# Patient Record
Sex: MALE | Race: WHITE | NOT HISPANIC OR LATINO | Employment: FULL TIME | URBAN - NONMETROPOLITAN AREA
[De-identification: names, ages, dates, MRNs, and addresses within clinical notes are randomized per-mention and may not be internally consistent; named-entity substitution may affect disease eponyms.]

---

## 2018-03-08 ENCOUNTER — TRANSCRIBE ORDERS (OUTPATIENT)
Dept: LAB | Facility: HOSPITAL | Age: 53
End: 2018-03-08

## 2018-03-08 ENCOUNTER — HOSPITAL ENCOUNTER (OUTPATIENT)
Dept: GENERAL RADIOLOGY | Facility: HOSPITAL | Age: 53
Discharge: HOME | End: 2018-03-08
Admitting: NURSE PRACTITIONER

## 2018-03-08 DIAGNOSIS — S40.011A CONTUSION OF MULTIPLE SITES OF SHOULDER, RIGHT, INITIAL ENCOUNTER: Primary | ICD-10-CM

## 2018-03-08 DIAGNOSIS — S40.011A CONTUSION OF MULTIPLE SITES OF SHOULDER, RIGHT, INITIAL ENCOUNTER: ICD-10-CM

## 2018-03-08 PROCEDURE — 73030 X-RAY EXAM OF SHOULDER: CPT | Performed by: RADIOLOGY

## 2018-03-08 PROCEDURE — 73030 X-RAY EXAM OF SHOULDER: CPT

## 2020-08-30 ENCOUNTER — HOSPITAL ENCOUNTER (EMERGENCY)
Facility: HOSPITAL | Age: 55
Discharge: HOME | End: 2020-08-30
Attending: FAMILY MEDICINE | Admitting: FAMILY MEDICINE

## 2020-08-30 ENCOUNTER — APPOINTMENT (OUTPATIENT)
Dept: GENERAL RADIOLOGY | Facility: HOSPITAL | Age: 55
End: 2020-08-30

## 2020-08-30 VITALS
WEIGHT: 240 LBS | HEART RATE: 85 BPM | OXYGEN SATURATION: 96 % | HEIGHT: 68 IN | SYSTOLIC BLOOD PRESSURE: 135 MMHG | RESPIRATION RATE: 18 BRPM | BODY MASS INDEX: 36.37 KG/M2 | DIASTOLIC BLOOD PRESSURE: 90 MMHG | TEMPERATURE: 97.5 F

## 2020-08-30 DIAGNOSIS — M65.9 TENOSYNOVITIS OF LEFT HAND: Primary | ICD-10-CM

## 2020-08-30 PROCEDURE — 73120 X-RAY EXAM OF HAND: CPT

## 2020-08-30 PROCEDURE — 99283 EMERGENCY DEPT VISIT LOW MDM: CPT

## 2020-08-30 PROCEDURE — 96372 THER/PROPH/DIAG INJ SC/IM: CPT

## 2020-08-30 PROCEDURE — 25010000002 KETOROLAC TROMETHAMINE PER 15 MG: Performed by: FAMILY MEDICINE

## 2020-08-30 RX ORDER — KETOROLAC TROMETHAMINE 30 MG/ML
60 INJECTION, SOLUTION INTRAMUSCULAR; INTRAVENOUS ONCE
Status: COMPLETED | OUTPATIENT
Start: 2020-08-30 | End: 2020-08-30

## 2020-08-30 RX ORDER — IBUPROFEN 800 MG/1
800 TABLET ORAL
Qty: 90 TABLET | Refills: 0 | OUTPATIENT
Start: 2020-08-30 | End: 2022-11-24

## 2020-08-30 RX ADMIN — KETOROLAC TROMETHAMINE 60 MG: 60 INJECTION, SOLUTION INTRAMUSCULAR at 20:50

## 2021-03-18 ENCOUNTER — BULK ORDERING (OUTPATIENT)
Dept: CASE MANAGEMENT | Facility: OTHER | Age: 56
End: 2021-03-18

## 2021-03-18 DIAGNOSIS — Z23 IMMUNIZATION DUE: ICD-10-CM

## 2022-11-24 ENCOUNTER — HOSPITAL ENCOUNTER (EMERGENCY)
Facility: HOSPITAL | Age: 57
Discharge: HOME | End: 2022-11-24
Attending: EMERGENCY MEDICINE | Admitting: EMERGENCY MEDICINE

## 2022-11-24 ENCOUNTER — APPOINTMENT (OUTPATIENT)
Dept: GENERAL RADIOLOGY | Facility: HOSPITAL | Age: 57
End: 2022-11-24

## 2022-11-24 ENCOUNTER — APPOINTMENT (OUTPATIENT)
Dept: CT IMAGING | Facility: HOSPITAL | Age: 57
End: 2022-11-24

## 2022-11-24 VITALS
OXYGEN SATURATION: 98 % | TEMPERATURE: 98.2 F | HEIGHT: 68 IN | RESPIRATION RATE: 16 BRPM | BODY MASS INDEX: 34.86 KG/M2 | WEIGHT: 230 LBS | SYSTOLIC BLOOD PRESSURE: 125 MMHG | HEART RATE: 62 BPM | DIASTOLIC BLOOD PRESSURE: 80 MMHG

## 2022-11-24 DIAGNOSIS — R56.9 SEIZURE: Primary | ICD-10-CM

## 2022-11-24 LAB
ALBUMIN SERPL-MCNC: 4.2 G/DL (ref 3.5–5.2)
ALBUMIN/GLOB SERPL: 1.5 G/DL
ALP SERPL-CCNC: 97 U/L (ref 39–117)
ALT SERPL W P-5'-P-CCNC: 31 U/L (ref 1–41)
ANION GAP SERPL CALCULATED.3IONS-SCNC: 13.6 MMOL/L (ref 5–15)
AST SERPL-CCNC: 26 U/L (ref 1–40)
BASOPHILS # BLD AUTO: 0.03 10*3/MM3 (ref 0–0.2)
BASOPHILS NFR BLD AUTO: 0.3 % (ref 0–1.5)
BILIRUB SERPL-MCNC: 0.3 MG/DL (ref 0–1.2)
BUN SERPL-MCNC: 21 MG/DL (ref 6–20)
BUN/CREAT SERPL: 16.9 (ref 7–25)
CALCIUM SPEC-SCNC: 8.6 MG/DL (ref 8.6–10.5)
CHLORIDE SERPL-SCNC: 104 MMOL/L (ref 98–107)
CK SERPL-CCNC: 372 U/L (ref 20–200)
CO2 SERPL-SCNC: 19.4 MMOL/L (ref 22–29)
CREAT SERPL-MCNC: 1.24 MG/DL (ref 0.76–1.27)
DEPRECATED RDW RBC AUTO: 48.2 FL (ref 37–54)
EGFRCR SERPLBLD CKD-EPI 2021: 67 ML/MIN/1.73
EOSINOPHIL # BLD AUTO: 0.06 10*3/MM3 (ref 0–0.4)
EOSINOPHIL NFR BLD AUTO: 0.5 % (ref 0.3–6.2)
ERYTHROCYTE [DISTWIDTH] IN BLOOD BY AUTOMATED COUNT: 13.7 % (ref 12.3–15.4)
GLOBULIN UR ELPH-MCNC: 2.8 GM/DL
GLUCOSE SERPL-MCNC: 115 MG/DL (ref 65–99)
HCT VFR BLD AUTO: 46.2 % (ref 37.5–51)
HGB BLD-MCNC: 15.2 G/DL (ref 13–17.7)
HOLD SPECIMEN: NORMAL
HOLD SPECIMEN: NORMAL
IMM GRANULOCYTES # BLD AUTO: 0.04 10*3/MM3 (ref 0–0.05)
IMM GRANULOCYTES NFR BLD AUTO: 0.4 % (ref 0–0.5)
LYMPHOCYTES # BLD AUTO: 0.88 10*3/MM3 (ref 0.7–3.1)
LYMPHOCYTES NFR BLD AUTO: 7.8 % (ref 19.6–45.3)
MAGNESIUM SERPL-MCNC: 2.8 MG/DL (ref 1.6–2.6)
MCH RBC QN AUTO: 31.5 PG (ref 26.6–33)
MCHC RBC AUTO-ENTMCNC: 32.9 G/DL (ref 31.5–35.7)
MCV RBC AUTO: 95.9 FL (ref 79–97)
MONOCYTES # BLD AUTO: 0.73 10*3/MM3 (ref 0.1–0.9)
MONOCYTES NFR BLD AUTO: 6.5 % (ref 5–12)
MYOGLOBIN SERPL-MCNC: 962.1 NG/ML (ref 28–72)
NEUTROPHILS NFR BLD AUTO: 84.5 % (ref 42.7–76)
NEUTROPHILS NFR BLD AUTO: 9.49 10*3/MM3 (ref 1.7–7)
NRBC BLD AUTO-RTO: 0 /100 WBC (ref 0–0.2)
PLATELET # BLD AUTO: 171 10*3/MM3 (ref 140–450)
PMV BLD AUTO: 9.3 FL (ref 6–12)
POTASSIUM SERPL-SCNC: 3.6 MMOL/L (ref 3.5–5.2)
PROT SERPL-MCNC: 7 G/DL (ref 6–8.5)
RBC # BLD AUTO: 4.82 10*6/MM3 (ref 4.14–5.8)
SODIUM SERPL-SCNC: 137 MMOL/L (ref 136–145)
TROPONIN T SERPL-MCNC: <0.01 NG/ML (ref 0–0.03)
WBC NRBC COR # BLD: 11.23 10*3/MM3 (ref 3.4–10.8)
WHOLE BLOOD HOLD COAG: NORMAL
WHOLE BLOOD HOLD SPECIMEN: NORMAL

## 2022-11-24 PROCEDURE — 83874 ASSAY OF MYOGLOBIN: CPT | Performed by: PHYSICIAN ASSISTANT

## 2022-11-24 PROCEDURE — 99284 EMERGENCY DEPT VISIT MOD MDM: CPT

## 2022-11-24 PROCEDURE — 85025 COMPLETE CBC W/AUTO DIFF WBC: CPT | Performed by: PHYSICIAN ASSISTANT

## 2022-11-24 PROCEDURE — 93005 ELECTROCARDIOGRAM TRACING: CPT | Performed by: PHYSICIAN ASSISTANT

## 2022-11-24 PROCEDURE — 71045 X-RAY EXAM CHEST 1 VIEW: CPT

## 2022-11-24 PROCEDURE — 36415 COLL VENOUS BLD VENIPUNCTURE: CPT

## 2022-11-24 PROCEDURE — 82550 ASSAY OF CK (CPK): CPT | Performed by: PHYSICIAN ASSISTANT

## 2022-11-24 PROCEDURE — 73030 X-RAY EXAM OF SHOULDER: CPT

## 2022-11-24 PROCEDURE — 84484 ASSAY OF TROPONIN QUANT: CPT | Performed by: PHYSICIAN ASSISTANT

## 2022-11-24 PROCEDURE — 96374 THER/PROPH/DIAG INJ IV PUSH: CPT

## 2022-11-24 PROCEDURE — 70450 CT HEAD/BRAIN W/O DYE: CPT

## 2022-11-24 PROCEDURE — 80053 COMPREHEN METABOLIC PANEL: CPT | Performed by: PHYSICIAN ASSISTANT

## 2022-11-24 PROCEDURE — 0 LEVETIRACETAM IN NACL 0.54% 1500 MG/100ML SOLUTION: Performed by: PHYSICIAN ASSISTANT

## 2022-11-24 PROCEDURE — 83735 ASSAY OF MAGNESIUM: CPT | Performed by: PHYSICIAN ASSISTANT

## 2022-11-24 RX ORDER — SODIUM CHLORIDE 0.9 % (FLUSH) 0.9 %
10 SYRINGE (ML) INJECTION AS NEEDED
Status: DISCONTINUED | OUTPATIENT
Start: 2022-11-24 | End: 2022-11-24 | Stop reason: HOSPADM

## 2022-11-24 RX ORDER — LEVETIRACETAM 500 MG/1
500 TABLET ORAL 2 TIMES DAILY
Qty: 60 TABLET | Refills: 0 | Status: SHIPPED | OUTPATIENT
Start: 2022-11-24

## 2022-11-24 RX ORDER — LEVETIRACETAM 15 MG/ML
1500 INJECTION INTRAVASCULAR ONCE
Status: COMPLETED | OUTPATIENT
Start: 2022-11-24 | End: 2022-11-24

## 2022-11-24 RX ADMIN — LEVETIRACETAM 1500 MG: 15 INJECTION INTRAVENOUS at 20:05

## 2022-11-24 NOTE — ED PROVIDER NOTES
"Subjective   History of Present Illness  57-year-old male presents via EMS status post episode that was reported to be a seizure.  Patient's daughter saw this episode.  She states that patient was acting \"spacey\" and then subsequently fell backwards with his arms drawn in.  He subsequently had a jerking episode that lasted at least a minute and was confused and nonresponsive for approximately 7 to 8 minutes.  Apparently patient had a similar episode to this 3 to 4 years ago however this was attributed to heatstroke.  Patient denies any history of seizure.  He states has been feeling fine recently.  No nausea vomiting diarrhea.  No chest pain pressure tightness or squeezing.  No abdominal pain.  Patient does have pain in his right shoulder after the fall.  He has neurologically intact at this time.  He is awake alert and oriented x3.  He is not complaining of any other symptoms.        Review of Systems   Constitutional: Negative.  Negative for fever.   HENT: Negative.    Respiratory: Negative.    Cardiovascular: Negative.  Negative for chest pain.   Gastrointestinal: Negative.  Negative for abdominal pain.   Endocrine: Negative.    Genitourinary: Negative.  Negative for dysuria.   Skin: Negative.    Neurological: Negative.    Psychiatric/Behavioral: Negative.    All other systems reviewed and are negative.      Past Medical History:   Diagnosis Date   • Arthritis    • Bulging lumbar disc    • Chronic back pain    • Hypokalemia        No Known Allergies    Past Surgical History:   Procedure Laterality Date   • COLONOSCOPY         Family History   Problem Relation Age of Onset   • Hypokalemia Mother    • No Known Problems Father        Social History     Socioeconomic History   • Marital status:    • Number of children: 1   Tobacco Use   • Smoking status: Former     Packs/day: 1.00     Years: 5.00     Pack years: 5.00     Types: Cigarettes     Quit date:      Years since quittin.9   Substance and " Sexual Activity   • Alcohol use: Yes     Comment: Occasional social drinker   • Drug use: No   • Sexual activity: Defer           Objective   Physical Exam  Vitals and nursing note reviewed.   Constitutional:       General: He is not in acute distress.     Appearance: He is well-developed. He is not diaphoretic.   HENT:      Head: Normocephalic and atraumatic.      Right Ear: External ear normal.      Left Ear: External ear normal.      Nose: Nose normal.   Eyes:      Conjunctiva/sclera: Conjunctivae normal.      Pupils: Pupils are equal, round, and reactive to light.   Neck:      Vascular: No JVD.      Trachea: No tracheal deviation.   Cardiovascular:      Rate and Rhythm: Normal rate and regular rhythm.      Heart sounds: Normal heart sounds. No murmur heard.  Pulmonary:      Effort: Pulmonary effort is normal. No respiratory distress.      Breath sounds: Normal breath sounds. No wheezing.   Abdominal:      General: Bowel sounds are normal.      Palpations: Abdomen is soft.      Tenderness: There is no abdominal tenderness.   Musculoskeletal:         General: No deformity. Normal range of motion.      Cervical back: Normal range of motion and neck supple.   Skin:     General: Skin is warm and dry.      Coloration: Skin is not pale.      Findings: No erythema or rash.   Neurological:      Mental Status: He is alert and oriented to person, place, and time.      Cranial Nerves: No cranial nerve deficit.   Psychiatric:         Behavior: Behavior normal.         Thought Content: Thought content normal.         Procedures           ED Course                                           MDM  Number of Diagnoses or Management Options  Seizure (HCC): new and requires workup     Amount and/or Complexity of Data Reviewed  Clinical lab tests: reviewed and ordered  Tests in the radiology section of CPT®: reviewed and ordered  Tests in the medicine section of CPT®: reviewed and ordered  Independent visualization of images,  tracings, or specimens: yes        Final diagnoses:   Seizure (HCC)       ED Disposition  ED Disposition     ED Disposition   Discharge    Condition   Stable    Comment   --             PATIENT CONNECTION - [facility]  See Provider List  [place] [id number]  [phone]  Schedule an appointment as soon as possible for a visit       Andre Romero, APRN  [address] [facility] [address]  [phone]    Schedule an appointment as soon as possible for a visit       Jose E Bruce MD  [address] [place] [address] 2  [place] [id number]  [phone]    Schedule an appointment as soon as possible for a visit            Medication List      New Prescriptions    levETIRAcetam 500 MG tablet  Commonly known as: KEPPRA  Take 1 tablet by mouth 2 (Two) Times a Day.        Stop    diclofenac 1 % gel gel  Commonly known as: VOLTAREN     ibuprofen 800 MG tablet  Commonly known as: ADVIL,MOTRIN           Where to Get Your Medications      You can get these medications from any pharmacy    Bring a paper prescription for each of these medications  · levETIRAcetam 500 MG tablet          Mason Dunlap PA  11/24/22 [id number]

## 2022-11-25 LAB
QT INTERVAL: 358 MS
QTC INTERVAL: 447 MS

## 2023-06-09 ENCOUNTER — LAB (OUTPATIENT)
Dept: LAB | Facility: HOSPITAL | Age: 58
End: 2023-06-09
Payer: COMMERCIAL

## 2023-06-09 ENCOUNTER — TRANSCRIBE ORDERS (OUTPATIENT)
Dept: ADMINISTRATIVE | Facility: HOSPITAL | Age: 58
End: 2023-06-09
Payer: COMMERCIAL

## 2023-06-09 DIAGNOSIS — Z00.00 ROUTINE GENERAL MEDICAL EXAMINATION AT A HEALTH CARE FACILITY: ICD-10-CM

## 2023-06-09 DIAGNOSIS — Z00.00 ROUTINE GENERAL MEDICAL EXAMINATION AT A HEALTH CARE FACILITY: Primary | ICD-10-CM

## 2023-06-09 LAB
25(OH)D3 SERPL-MCNC: 21.7 NG/ML (ref 30–100)
ALBUMIN SERPL-MCNC: 4.5 G/DL (ref 3.5–5.2)
ALBUMIN/GLOB SERPL: 1.7 G/DL
ALP SERPL-CCNC: 74 U/L (ref 39–117)
ALT SERPL W P-5'-P-CCNC: 18 U/L (ref 1–41)
ANION GAP SERPL CALCULATED.3IONS-SCNC: 12.3 MMOL/L (ref 5–15)
AST SERPL-CCNC: 25 U/L (ref 1–40)
BASOPHILS # BLD AUTO: 0.03 10*3/MM3 (ref 0–0.2)
BASOPHILS NFR BLD AUTO: 0.6 % (ref 0–1.5)
BILIRUB SERPL-MCNC: 1.1 MG/DL (ref 0–1.2)
BUN SERPL-MCNC: 13 MG/DL (ref 6–20)
BUN/CREAT SERPL: 15.1 (ref 7–25)
CALCIUM SPEC-SCNC: 9.1 MG/DL (ref 8.6–10.5)
CHLORIDE SERPL-SCNC: 106 MMOL/L (ref 98–107)
CHOLEST SERPL-MCNC: 214 MG/DL (ref 0–200)
CO2 SERPL-SCNC: 21.7 MMOL/L (ref 22–29)
CREAT SERPL-MCNC: 0.86 MG/DL (ref 0.76–1.27)
DEPRECATED RDW RBC AUTO: 44.7 FL (ref 37–54)
EGFRCR SERPLBLD CKD-EPI 2021: 99.7 ML/MIN/1.73
EOSINOPHIL # BLD AUTO: 0.13 10*3/MM3 (ref 0–0.4)
EOSINOPHIL NFR BLD AUTO: 2.4 % (ref 0.3–6.2)
ERYTHROCYTE [DISTWIDTH] IN BLOOD BY AUTOMATED COUNT: 13.2 % (ref 12.3–15.4)
GLOBULIN UR ELPH-MCNC: 2.6 GM/DL
GLUCOSE SERPL-MCNC: 91 MG/DL (ref 65–99)
HBA1C MFR BLD: 5.2 % (ref 4.8–5.6)
HCT VFR BLD AUTO: 46 % (ref 37.5–51)
HDLC SERPL-MCNC: 57 MG/DL (ref 40–60)
HGB BLD-MCNC: 15.6 G/DL (ref 13–17.7)
IMM GRANULOCYTES # BLD AUTO: 0.01 10*3/MM3 (ref 0–0.05)
IMM GRANULOCYTES NFR BLD AUTO: 0.2 % (ref 0–0.5)
LDLC SERPL CALC-MCNC: 145 MG/DL (ref 0–100)
LDLC/HDLC SERPL: 2.53 {RATIO}
LYMPHOCYTES # BLD AUTO: 1.61 10*3/MM3 (ref 0.7–3.1)
LYMPHOCYTES NFR BLD AUTO: 30.3 % (ref 19.6–45.3)
MCH RBC QN AUTO: 31.9 PG (ref 26.6–33)
MCHC RBC AUTO-ENTMCNC: 33.9 G/DL (ref 31.5–35.7)
MCV RBC AUTO: 94.1 FL (ref 79–97)
MONOCYTES # BLD AUTO: 0.61 10*3/MM3 (ref 0.1–0.9)
MONOCYTES NFR BLD AUTO: 11.5 % (ref 5–12)
NEUTROPHILS NFR BLD AUTO: 2.92 10*3/MM3 (ref 1.7–7)
NEUTROPHILS NFR BLD AUTO: 55 % (ref 42.7–76)
NRBC BLD AUTO-RTO: 0 /100 WBC (ref 0–0.2)
PLATELET # BLD AUTO: 181 10*3/MM3 (ref 140–450)
PMV BLD AUTO: 9.5 FL (ref 6–12)
POTASSIUM SERPL-SCNC: 4.5 MMOL/L (ref 3.5–5.2)
PROT SERPL-MCNC: 7.1 G/DL (ref 6–8.5)
PSA SERPL-MCNC: 0.37 NG/ML (ref 0–4)
RBC # BLD AUTO: 4.89 10*6/MM3 (ref 4.14–5.8)
SODIUM SERPL-SCNC: 140 MMOL/L (ref 136–145)
TRIGL SERPL-MCNC: 65 MG/DL (ref 0–150)
TSH SERPL DL<=0.05 MIU/L-ACNC: 1.6 UIU/ML (ref 0.27–4.2)
VLDLC SERPL-MCNC: 12 MG/DL (ref 5–40)
WBC NRBC COR # BLD: 5.31 10*3/MM3 (ref 3.4–10.8)

## 2023-06-09 PROCEDURE — 83036 HEMOGLOBIN GLYCOSYLATED A1C: CPT

## 2023-06-09 PROCEDURE — 82306 VITAMIN D 25 HYDROXY: CPT

## 2023-06-09 PROCEDURE — 36415 COLL VENOUS BLD VENIPUNCTURE: CPT

## 2023-06-09 PROCEDURE — 80050 GENERAL HEALTH PANEL: CPT

## 2023-06-09 PROCEDURE — 80061 LIPID PANEL: CPT

## 2023-06-09 PROCEDURE — 84153 ASSAY OF PSA TOTAL: CPT

## 2024-03-29 ENCOUNTER — TRANSCRIBE ORDERS (OUTPATIENT)
Dept: ADMINISTRATIVE | Facility: HOSPITAL | Age: 59
End: 2024-03-29
Payer: COMMERCIAL

## 2024-03-29 ENCOUNTER — LAB (OUTPATIENT)
Dept: LAB | Facility: HOSPITAL | Age: 59
End: 2024-03-29
Payer: COMMERCIAL

## 2024-03-29 DIAGNOSIS — E78.5 HYPERLIPIDEMIA, UNSPECIFIED HYPERLIPIDEMIA TYPE: Primary | ICD-10-CM

## 2024-03-29 DIAGNOSIS — E55.9 VITAMIN D DEFICIENCY DISEASE: ICD-10-CM

## 2024-03-29 DIAGNOSIS — Z12.5 SPECIAL SCREENING FOR MALIGNANT NEOPLASM OF PROSTATE: ICD-10-CM

## 2024-03-29 DIAGNOSIS — E78.5 HYPERLIPIDEMIA, UNSPECIFIED HYPERLIPIDEMIA TYPE: ICD-10-CM

## 2024-03-29 DIAGNOSIS — R53.83 TIREDNESS: ICD-10-CM

## 2024-03-29 LAB
25(OH)D3 SERPL-MCNC: 47.9 NG/ML (ref 30–100)
ALBUMIN SERPL-MCNC: 4.5 G/DL (ref 3.5–5.2)
ALBUMIN UR-MCNC: <1.2 MG/DL
ALBUMIN/GLOB SERPL: 1.6 G/DL
ALP SERPL-CCNC: 135 U/L (ref 39–117)
ALT SERPL W P-5'-P-CCNC: 32 U/L (ref 1–41)
ANION GAP SERPL CALCULATED.3IONS-SCNC: 12.9 MMOL/L (ref 5–15)
AST SERPL-CCNC: 27 U/L (ref 1–40)
BASOPHILS # BLD AUTO: 0.04 10*3/MM3 (ref 0–0.2)
BASOPHILS NFR BLD AUTO: 0.6 % (ref 0–1.5)
BILIRUB SERPL-MCNC: 0.3 MG/DL (ref 0–1.2)
BUN SERPL-MCNC: 24 MG/DL (ref 8–23)
BUN/CREAT SERPL: 29.6 (ref 7–25)
CALCIUM SPEC-SCNC: 8.8 MG/DL (ref 8.6–10.5)
CHLORIDE SERPL-SCNC: 106 MMOL/L (ref 98–107)
CHOLEST SERPL-MCNC: 198 MG/DL (ref 0–200)
CO2 SERPL-SCNC: 21.1 MMOL/L (ref 22–29)
CREAT SERPL-MCNC: 0.81 MG/DL (ref 0.76–1.27)
CREAT UR-MCNC: 124.6 MG/DL
DEPRECATED RDW RBC AUTO: 49.1 FL (ref 37–54)
EGFRCR SERPLBLD CKD-EPI 2021: 100.9 ML/MIN/1.73
EOSINOPHIL # BLD AUTO: 0.24 10*3/MM3 (ref 0–0.4)
EOSINOPHIL NFR BLD AUTO: 3.4 % (ref 0.3–6.2)
ERYTHROCYTE [DISTWIDTH] IN BLOOD BY AUTOMATED COUNT: 13.5 % (ref 12.3–15.4)
GLOBULIN UR ELPH-MCNC: 2.9 GM/DL
GLUCOSE SERPL-MCNC: 123 MG/DL (ref 65–99)
HBA1C MFR BLD: 5.3 % (ref 4.8–5.6)
HCT VFR BLD AUTO: 49.8 % (ref 37.5–51)
HDLC SERPL-MCNC: 41 MG/DL (ref 40–60)
HGB BLD-MCNC: 16.4 G/DL (ref 13–17.7)
IMM GRANULOCYTES # BLD AUTO: 0.03 10*3/MM3 (ref 0–0.05)
IMM GRANULOCYTES NFR BLD AUTO: 0.4 % (ref 0–0.5)
LDLC SERPL CALC-MCNC: 100 MG/DL (ref 0–100)
LDLC/HDLC SERPL: 2.19 {RATIO}
LYMPHOCYTES # BLD AUTO: 1.92 10*3/MM3 (ref 0.7–3.1)
LYMPHOCYTES NFR BLD AUTO: 26.8 % (ref 19.6–45.3)
MCH RBC QN AUTO: 31.7 PG (ref 26.6–33)
MCHC RBC AUTO-ENTMCNC: 32.9 G/DL (ref 31.5–35.7)
MCV RBC AUTO: 96.3 FL (ref 79–97)
MICROALBUMIN/CREAT UR: NORMAL MG/G{CREAT}
MONOCYTES # BLD AUTO: 0.53 10*3/MM3 (ref 0.1–0.9)
MONOCYTES NFR BLD AUTO: 7.4 % (ref 5–12)
NEUTROPHILS NFR BLD AUTO: 4.4 10*3/MM3 (ref 1.7–7)
NEUTROPHILS NFR BLD AUTO: 61.4 % (ref 42.7–76)
NRBC BLD AUTO-RTO: 0 /100 WBC (ref 0–0.2)
PLATELET # BLD AUTO: 206 10*3/MM3 (ref 140–450)
PMV BLD AUTO: 9.6 FL (ref 6–12)
POTASSIUM SERPL-SCNC: 4 MMOL/L (ref 3.5–5.2)
PROT SERPL-MCNC: 7.4 G/DL (ref 6–8.5)
PSA SERPL-MCNC: 0.58 NG/ML (ref 0–4)
RBC # BLD AUTO: 5.17 10*6/MM3 (ref 4.14–5.8)
SODIUM SERPL-SCNC: 140 MMOL/L (ref 136–145)
T4 FREE SERPL-MCNC: 1.19 NG/DL (ref 0.93–1.7)
TRIGL SERPL-MCNC: 336 MG/DL (ref 0–150)
TSH SERPL DL<=0.05 MIU/L-ACNC: 1.53 UIU/ML (ref 0.27–4.2)
VIT B12 BLD-MCNC: 459 PG/ML (ref 211–946)
VLDLC SERPL-MCNC: 57 MG/DL (ref 5–40)
WBC NRBC COR # BLD AUTO: 7.16 10*3/MM3 (ref 3.4–10.8)

## 2024-03-29 PROCEDURE — 36415 COLL VENOUS BLD VENIPUNCTURE: CPT

## 2024-03-29 PROCEDURE — 82043 UR ALBUMIN QUANTITATIVE: CPT

## 2024-03-29 PROCEDURE — 82306 VITAMIN D 25 HYDROXY: CPT

## 2024-03-29 PROCEDURE — G0103 PSA SCREENING: HCPCS

## 2024-03-29 PROCEDURE — 80050 GENERAL HEALTH PANEL: CPT

## 2024-03-29 PROCEDURE — 83036 HEMOGLOBIN GLYCOSYLATED A1C: CPT

## 2024-03-29 PROCEDURE — 82570 ASSAY OF URINE CREATININE: CPT

## 2024-03-29 PROCEDURE — 84439 ASSAY OF FREE THYROXINE: CPT

## 2024-03-29 PROCEDURE — 81001 URINALYSIS AUTO W/SCOPE: CPT

## 2024-03-29 PROCEDURE — 82607 VITAMIN B-12: CPT

## 2024-03-29 PROCEDURE — 80061 LIPID PANEL: CPT

## 2024-03-30 LAB
BACTERIA UR QL AUTO: NORMAL /HPF
BILIRUB UR QL STRIP: NEGATIVE
CLARITY UR: CLEAR
COD CRY URNS QL: NORMAL /HPF
COLOR UR: YELLOW
GLUCOSE UR STRIP-MCNC: NEGATIVE MG/DL
HGB UR QL STRIP.AUTO: NEGATIVE
HYALINE CASTS UR QL AUTO: NORMAL /LPF
KETONES UR QL STRIP: NEGATIVE
LEUKOCYTE ESTERASE UR QL STRIP.AUTO: NEGATIVE
NITRITE UR QL STRIP: NEGATIVE
PH UR STRIP.AUTO: 5.5 [PH] (ref 5–8)
PROT UR QL STRIP: NEGATIVE
RBC # UR STRIP: NORMAL /HPF
REF LAB TEST METHOD: NORMAL
SP GR UR STRIP: 1.02 (ref 1–1.03)
SQUAMOUS #/AREA URNS HPF: NORMAL /HPF
UROBILINOGEN UR QL STRIP: NORMAL
WBC # UR STRIP: NORMAL /HPF

## 2024-07-05 ENCOUNTER — OFFICE VISIT (OUTPATIENT)
Dept: UROLOGY | Facility: CLINIC | Age: 59
End: 2024-07-05
Payer: COMMERCIAL

## 2024-07-05 ENCOUNTER — TRANSCRIBE ORDERS (OUTPATIENT)
Dept: ADMINISTRATIVE | Facility: HOSPITAL | Age: 59
End: 2024-07-05
Payer: COMMERCIAL

## 2024-07-05 ENCOUNTER — LAB (OUTPATIENT)
Dept: LAB | Facility: HOSPITAL | Age: 59
End: 2024-07-05
Payer: COMMERCIAL

## 2024-07-05 VITALS
HEIGHT: 67 IN | BODY MASS INDEX: 34.06 KG/M2 | HEART RATE: 66 BPM | DIASTOLIC BLOOD PRESSURE: 88 MMHG | WEIGHT: 217 LBS | SYSTOLIC BLOOD PRESSURE: 151 MMHG

## 2024-07-05 DIAGNOSIS — R73.09 ELEVATED GLUCOSE: ICD-10-CM

## 2024-07-05 DIAGNOSIS — R53.83 OTHER FATIGUE: ICD-10-CM

## 2024-07-05 DIAGNOSIS — J06.9 UPPER RESPIRATORY TRACT INFECTION, UNSPECIFIED TYPE: ICD-10-CM

## 2024-07-05 DIAGNOSIS — J06.9 UPPER RESPIRATORY TRACT INFECTION, UNSPECIFIED TYPE: Primary | ICD-10-CM

## 2024-07-05 DIAGNOSIS — E55.9 VITAMIN D DEFICIENCY: ICD-10-CM

## 2024-07-05 DIAGNOSIS — Z12.11 COLON CANCER SCREENING: ICD-10-CM

## 2024-07-05 DIAGNOSIS — N48.1 BALANITIS: Primary | ICD-10-CM

## 2024-07-05 DIAGNOSIS — E78.5 HYPERLIPIDEMIA, UNSPECIFIED HYPERLIPIDEMIA TYPE: ICD-10-CM

## 2024-07-05 LAB
25(OH)D3 SERPL-MCNC: 42.4 NG/ML (ref 30–100)
ALBUMIN SERPL-MCNC: 4.3 G/DL (ref 3.5–5.2)
ALBUMIN/GLOB SERPL: 1.8 G/DL
ALP SERPL-CCNC: 82 U/L (ref 39–117)
ALT SERPL W P-5'-P-CCNC: 23 U/L (ref 1–41)
ANION GAP SERPL CALCULATED.3IONS-SCNC: 11 MMOL/L (ref 5–15)
AST SERPL-CCNC: 27 U/L (ref 1–40)
BACTERIA UR QL AUTO: NORMAL /HPF
BASOPHILS # BLD AUTO: 0.05 10*3/MM3 (ref 0–0.2)
BASOPHILS NFR BLD AUTO: 0.8 % (ref 0–1.5)
BILIRUB SERPL-MCNC: 0.5 MG/DL (ref 0–1.2)
BILIRUB UR QL STRIP: NEGATIVE
BUN SERPL-MCNC: 15 MG/DL (ref 8–23)
BUN/CREAT SERPL: 13 (ref 7–25)
CALCIUM SPEC-SCNC: 9.3 MG/DL (ref 8.6–10.5)
CHLORIDE SERPL-SCNC: 103 MMOL/L (ref 98–107)
CHOLEST SERPL-MCNC: 187 MG/DL (ref 0–200)
CLARITY UR: CLEAR
CO2 SERPL-SCNC: 24 MMOL/L (ref 22–29)
COLOR UR: YELLOW
CREAT SERPL-MCNC: 1.15 MG/DL (ref 0.76–1.27)
DEPRECATED RDW RBC AUTO: 51.1 FL (ref 37–54)
EGFRCR SERPLBLD CKD-EPI 2021: 72.9 ML/MIN/1.73
EOSINOPHIL # BLD AUTO: 0.12 10*3/MM3 (ref 0–0.4)
EOSINOPHIL NFR BLD AUTO: 1.8 % (ref 0.3–6.2)
ERYTHROCYTE [DISTWIDTH] IN BLOOD BY AUTOMATED COUNT: 14.6 % (ref 12.3–15.4)
GLOBULIN UR ELPH-MCNC: 2.4 GM/DL
GLUCOSE SERPL-MCNC: 89 MG/DL (ref 65–99)
GLUCOSE UR STRIP-MCNC: NEGATIVE MG/DL
HBA1C MFR BLD: 5.2 % (ref 4.8–5.6)
HCT VFR BLD AUTO: 43.7 % (ref 37.5–51)
HDLC SERPL-MCNC: 43 MG/DL (ref 40–60)
HGB BLD-MCNC: 14.5 G/DL (ref 13–17.7)
HGB UR QL STRIP.AUTO: NEGATIVE
HYALINE CASTS UR QL AUTO: NORMAL /LPF
IMM GRANULOCYTES # BLD AUTO: 0.02 10*3/MM3 (ref 0–0.05)
IMM GRANULOCYTES NFR BLD AUTO: 0.3 % (ref 0–0.5)
KETONES UR QL STRIP: NEGATIVE
LDLC SERPL CALC-MCNC: 95 MG/DL (ref 0–100)
LDLC/HDLC SERPL: 1.99 {RATIO}
LEUKOCYTE ESTERASE UR QL STRIP.AUTO: NEGATIVE
LYMPHOCYTES # BLD AUTO: 2.07 10*3/MM3 (ref 0.7–3.1)
LYMPHOCYTES NFR BLD AUTO: 31.7 % (ref 19.6–45.3)
MCH RBC QN AUTO: 31.6 PG (ref 26.6–33)
MCHC RBC AUTO-ENTMCNC: 33.2 G/DL (ref 31.5–35.7)
MCV RBC AUTO: 95.2 FL (ref 79–97)
MONOCYTES # BLD AUTO: 0.66 10*3/MM3 (ref 0.1–0.9)
MONOCYTES NFR BLD AUTO: 10.1 % (ref 5–12)
NEUTROPHILS NFR BLD AUTO: 3.6 10*3/MM3 (ref 1.7–7)
NEUTROPHILS NFR BLD AUTO: 55.3 % (ref 42.7–76)
NITRITE UR QL STRIP: NEGATIVE
NRBC BLD AUTO-RTO: 0 /100 WBC (ref 0–0.2)
PH UR STRIP.AUTO: 7.5 [PH] (ref 5–8)
PLATELET # BLD AUTO: 177 10*3/MM3 (ref 140–450)
PMV BLD AUTO: 9.3 FL (ref 6–12)
POTASSIUM SERPL-SCNC: 4.3 MMOL/L (ref 3.5–5.2)
PROT SERPL-MCNC: 6.7 G/DL (ref 6–8.5)
PROT UR QL STRIP: NEGATIVE
RBC # BLD AUTO: 4.59 10*6/MM3 (ref 4.14–5.8)
RBC # UR STRIP: NORMAL /HPF
REF LAB TEST METHOD: NORMAL
SODIUM SERPL-SCNC: 138 MMOL/L (ref 136–145)
SP GR UR STRIP: 1.01 (ref 1–1.03)
SQUAMOUS #/AREA URNS HPF: NORMAL /HPF
TRIGL SERPL-MCNC: 292 MG/DL (ref 0–150)
UROBILINOGEN UR QL STRIP: NORMAL
VLDLC SERPL-MCNC: 49 MG/DL (ref 5–40)
WBC # UR STRIP: NORMAL /HPF
WBC NRBC COR # BLD AUTO: 6.52 10*3/MM3 (ref 3.4–10.8)

## 2024-07-05 PROCEDURE — 36415 COLL VENOUS BLD VENIPUNCTURE: CPT

## 2024-07-05 PROCEDURE — 80061 LIPID PANEL: CPT

## 2024-07-05 PROCEDURE — 81001 URINALYSIS AUTO W/SCOPE: CPT

## 2024-07-05 PROCEDURE — 80053 COMPREHEN METABOLIC PANEL: CPT

## 2024-07-05 PROCEDURE — 85025 COMPLETE CBC W/AUTO DIFF WBC: CPT

## 2024-07-05 PROCEDURE — 82306 VITAMIN D 25 HYDROXY: CPT

## 2024-07-05 PROCEDURE — 83036 HEMOGLOBIN GLYCOSYLATED A1C: CPT

## 2024-07-05 RX ORDER — TETRACYCLINE HCL 500 MG
CAPSULE ORAL
COMMUNITY

## 2024-07-05 RX ORDER — CLOTRIMAZOLE AND BETAMETHASONE DIPROPIONATE 10; .64 MG/G; MG/G
1 CREAM TOPICAL 2 TIMES DAILY
Qty: 45 G | Refills: 1 | Status: SHIPPED | OUTPATIENT
Start: 2024-07-05

## 2024-07-05 NOTE — PROGRESS NOTES
"Chief Complaint:    Chief Complaint   Patient presents with    Balanitis       Vital Signs:   /88 (BP Location: Right arm, Patient Position: Sitting)   Pulse 66   Ht 170.2 cm (67\")   Wt 98.4 kg (217 lb)   BMI 33.99 kg/m²   Body mass index is 33.99 kg/m².      HPI:  Regis Carlson is a 58 y.o. male who presents today for initial evaluation     History of Present Illness  Mr. Carlson presents to the clinic for evaluation of balanitis.  He has been referred to us by DYAN Montemayor.  He has a past medical history significant arthritis, bulging disc in the lumbar spine, and hypokalemia.  Patient states he is uncircumcised and was previously seen by urologist in 2006 and was scheduled to undergo surgery and elective circumcision at that time however his insurance would not pay for it.  He reports some intermittent difficulty with retraction of foreskin and restrictive band at times.  He does note cracking and bleeding of the foreskin.  He denies any abnormal discharge.  On physical exam today he did have some mild balanitis with easily retractable foreskin.  He does wish to undergo circumcision as soon as possible.  It is noted he had a PSA in March that came back great at 0.5.      Past Medical History:  Past Medical History:   Diagnosis Date    Arthritis     Bulging lumbar disc     Chronic back pain     Hypokalemia        Current Meds:  Current Outpatient Medications   Medication Sig Dispense Refill    Apple Cider Vinegar 500 MG tablet Take  by mouth.      Cholecalciferol 125 MCG (5000 UT) tablet Take 1 tablet by mouth Daily.      clotrimazole-betamethasone (LOTRISONE) 1-0.05 % cream Apply 1 Application topically to the appropriate area as directed 2 (Two) Times a Day. 45 g 1    levETIRAcetam (KEPPRA) 500 MG tablet Take 1 tablet by mouth 2 (Two) Times a Day. (Patient not taking: Reported on 7/5/2024) 60 tablet 0     No current facility-administered medications for this visit.        Allergies:   No " Known Allergies     Past Surgical History:  Past Surgical History:   Procedure Laterality Date    COLONOSCOPY         Social History:  Social History     Socioeconomic History    Marital status:     Number of children: 1   Tobacco Use    Smoking status: Former     Current packs/day: 0.00     Average packs/day: 1 pack/day for 5.0 years (5.0 ttl pk-yrs)     Types: Cigarettes     Start date:      Quit date:      Years since quittin.5   Vaping Use    Vaping status: Never Used   Substance and Sexual Activity    Alcohol use: Yes     Comment: Occasional social drinker    Drug use: No    Sexual activity: Defer       Family History:  Family History   Problem Relation Age of Onset    Hypokalemia Mother     No Known Problems Father        Review of Systems:  Review of Systems   Constitutional:  Negative for chills, fatigue and fever.   Respiratory:  Negative for cough, shortness of breath and wheezing.    Cardiovascular:  Negative for leg swelling.   Gastrointestinal:  Negative for abdominal pain, nausea and vomiting.   Genitourinary:  Positive for penile pain. Negative for difficulty urinating, frequency, hematuria, penile discharge, penile swelling, scrotal swelling, testicular pain and urgency.   Musculoskeletal:  Positive for back pain. Negative for joint swelling.   Neurological:  Negative for dizziness and headaches.   Psychiatric/Behavioral:  Negative for confusion.        Physical Exam:  Physical Exam  Constitutional:       General: He is not in acute distress.     Appearance: Normal appearance.   HENT:      Head: Normocephalic and atraumatic.      Nose: Nose normal.      Mouth/Throat:      Mouth: Mucous membranes are moist.   Eyes:      Conjunctiva/sclera: Conjunctivae normal.   Cardiovascular:      Rate and Rhythm: Normal rate and regular rhythm.      Pulses: Normal pulses.      Heart sounds: Normal heart sounds.   Pulmonary:      Effort: Pulmonary effort is normal.      Breath sounds: Normal  breath sounds.   Abdominal:      General: Bowel sounds are normal.      Palpations: Abdomen is soft.   Genitourinary:     Comments: Very mild balanitis with cracking of foreskin.  No bleeding or discharge noted  Musculoskeletal:         General: Normal range of motion.      Cervical back: Normal range of motion.   Skin:     General: Skin is warm.   Neurological:      General: No focal deficit present.      Mental Status: He is alert and oriented to person, place, and time.   Psychiatric:         Mood and Affect: Mood normal.         Behavior: Behavior normal.         Thought Content: Thought content normal.         Judgment: Judgment normal.              Recent Image (CT and/or KUB):   CT Abdomen and Pelvis: No results found for this or any previous visit.     CT Stone Protocol: No results found for this or any previous visit.     KUB: No results found for this or any previous visit.       Labs:  Brief Urine Lab Results  (Last result in the past 365 days)        Color   Clarity   Blood   Leuk Est   Nitrite   Protein   CREAT   Urine HCG        03/29/24 0902 Yellow   Clear   Negative   Negative   Negative   Negative           03/29/24 0902             124.6               No visits with results within 3 Month(s) from this visit.   Latest known visit with results is:   Lab on 03/29/2024   Component Date Value Ref Range Status    Hemoglobin A1C 03/29/2024 5.30  4.80 - 5.60 % Final    Glucose 03/29/2024 123 (H)  65 - 99 mg/dL Final    BUN 03/29/2024 24 (H)  8 - 23 mg/dL Final    Creatinine 03/29/2024 0.81  0.76 - 1.27 mg/dL Final    Sodium 03/29/2024 140  136 - 145 mmol/L Final    Potassium 03/29/2024 4.0  3.5 - 5.2 mmol/L Final    Chloride 03/29/2024 106  98 - 107 mmol/L Final    CO2 03/29/2024 21.1 (L)  22.0 - 29.0 mmol/L Final    Calcium 03/29/2024 8.8  8.6 - 10.5 mg/dL Final    Total Protein 03/29/2024 7.4  6.0 - 8.5 g/dL Final    Albumin 03/29/2024 4.5  3.5 - 5.2 g/dL Final    ALT (SGPT) 03/29/2024 32  1 - 41 U/L  Final    AST (SGOT) 03/29/2024 27  1 - 40 U/L Final    Alkaline Phosphatase 03/29/2024 135 (H)  39 - 117 U/L Final    Total Bilirubin 03/29/2024 0.3  0.0 - 1.2 mg/dL Final    Globulin 03/29/2024 2.9  gm/dL Final    A/G Ratio 03/29/2024 1.6  g/dL Final    BUN/Creatinine Ratio 03/29/2024 29.6 (H)  7.0 - 25.0 Final    Anion Gap 03/29/2024 12.9  5.0 - 15.0 mmol/L Final    eGFR 03/29/2024 100.9  >60.0 mL/min/1.73 Final    Total Cholesterol 03/29/2024 198  0 - 200 mg/dL Final    Triglycerides 03/29/2024 336 (H)  0 - 150 mg/dL Final    HDL Cholesterol 03/29/2024 41  40 - 60 mg/dL Final    LDL Cholesterol  03/29/2024 100  0 - 100 mg/dL Final    VLDL Cholesterol 03/29/2024 57 (H)  5 - 40 mg/dL Final    LDL/HDL Ratio 03/29/2024 2.19   Final    PSA 03/29/2024 0.577  0.000 - 4.000 ng/mL Final    Vitamin B-12 03/29/2024 459  211 - 946 pg/mL Final    TSH 03/29/2024 1.530  0.270 - 4.200 uIU/mL Final    Free T4 03/29/2024 1.19  0.93 - 1.70 ng/dL Final    T4 results may be falsely increased if patient taking Biotin.    25 Hydroxy, Vitamin D 03/29/2024 47.9  30.0 - 100.0 ng/ml Final    Microalbumin/Creatinine Ratio 03/29/2024    Final    Unable to calculate    Creatinine, Urine 03/29/2024 124.6  mg/dL Final    Microalbumin, Urine 03/29/2024 <1.2  mg/dL Final    Color, UA 03/29/2024 Yellow  Yellow, Straw Final    Appearance, UA 03/29/2024 Clear  Clear Final    pH, UA 03/29/2024 5.5  5.0 - 8.0 Final    Specific Gravity, UA 03/29/2024 1.025  1.005 - 1.030 Final    Glucose, UA 03/29/2024 Negative  Negative Final    Ketones, UA 03/29/2024 Negative  Negative Final    Bilirubin, UA 03/29/2024 Negative  Negative Final    Blood, UA 03/29/2024 Negative  Negative Final    Protein, UA 03/29/2024 Negative  Negative Final    Leuk Esterase, UA 03/29/2024 Negative  Negative Final    Nitrite, UA 03/29/2024 Negative  Negative Final    Urobilinogen, UA 03/29/2024 0.2 E.U./dL  0.2 - 1.0 E.U./dL Final    RBC, UA 03/29/2024 0-2  None Seen, 0-2 /HPF Final     WBC, UA 03/29/2024 0-2  None Seen, 0-2 /HPF Final    Bacteria, UA 03/29/2024 None Seen  None Seen /HPF Final    Squamous Epithelial Cells, UA 03/29/2024 0-2  None Seen, 0-2 /HPF Final    Hyaline Casts, UA 03/29/2024 0-2  None Seen /LPF Final    Calcium Oxalate Crystals, UA 03/29/2024 Large/3+  None Seen /HPF Final    Methodology 03/29/2024 Manual Light Microscopy   Final    WBC 03/29/2024 7.16  3.40 - 10.80 10*3/mm3 Final    RBC 03/29/2024 5.17  4.14 - 5.80 10*6/mm3 Final    Hemoglobin 03/29/2024 16.4  13.0 - 17.7 g/dL Final    Hematocrit 03/29/2024 49.8  37.5 - 51.0 % Final    MCV 03/29/2024 96.3  79.0 - 97.0 fL Final    MCH 03/29/2024 31.7  26.6 - 33.0 pg Final    MCHC 03/29/2024 32.9  31.5 - 35.7 g/dL Final    RDW 03/29/2024 13.5  12.3 - 15.4 % Final    RDW-SD 03/29/2024 49.1  37.0 - 54.0 fl Final    MPV 03/29/2024 9.6  6.0 - 12.0 fL Final    Platelets 03/29/2024 206  140 - 450 10*3/mm3 Final    Neutrophil % 03/29/2024 61.4  42.7 - 76.0 % Final    Lymphocyte % 03/29/2024 26.8  19.6 - 45.3 % Final    Monocyte % 03/29/2024 7.4  5.0 - 12.0 % Final    Eosinophil % 03/29/2024 3.4  0.3 - 6.2 % Final    Basophil % 03/29/2024 0.6  0.0 - 1.5 % Final    Immature Grans % 03/29/2024 0.4  0.0 - 0.5 % Final    Neutrophils, Absolute 03/29/2024 4.40  1.70 - 7.00 10*3/mm3 Final    Lymphocytes, Absolute 03/29/2024 1.92  0.70 - 3.10 10*3/mm3 Final    Monocytes, Absolute 03/29/2024 0.53  0.10 - 0.90 10*3/mm3 Final    Eosinophils, Absolute 03/29/2024 0.24  0.00 - 0.40 10*3/mm3 Final    Basophils, Absolute 03/29/2024 0.04  0.00 - 0.20 10*3/mm3 Final    Immature Grans, Absolute 03/29/2024 0.03  0.00 - 0.05 10*3/mm3 Final    nRBC 03/29/2024 0.0  0.0 - 0.2 /100 WBC Final        Procedure: None  Procedures     I have reviewed and agree with the above PMH, PSH, FMH, social history, medications, allergies, and labs.     Assessment/Plan:   Problem List Items Addressed This Visit    None  Visit Diagnoses       Balanitis    -  Primary     Relevant Medications    clotrimazole-betamethasone (LOTRISONE) 1-0.05 % cream    Other Relevant Orders    Case Request (Completed)            Health Maintenance:   Health Maintenance Due   Topic Date Due    BMI FOLLOWUP  Never done    COLORECTAL CANCER SCREENING  Never done    TDAP/TD VACCINES (1 - Tdap) Never done    ZOSTER VACCINE (1 of 2) Never done    RSV Vaccine - Adults (1 - 1-dose 60+ series) Never done    COVID-19 Vaccine (1 - 2023-24 season) Never done    HEPATITIS C SCREENING  Never done    ANNUAL PHYSICAL  Never done        Smoking Counseling: Former smoker.  Never used smokeless tobacco.    Urine Incontinence: Patient reports that he is not currently experiencing any symptoms of urinary incontinence.    Patient was given instructions and counseling regarding his condition or for health maintenance advice. Please see specific information pulled into the AVS if appropriate.    Patient Education:   Balanitis -discussed with the patient the pathophysiology of this condition in detail.  I discussed causes which can include sexual activity, improper hygiene, type 2 diabetes mellitus, and obesity.  Patient had very minimal balanitis and I did recommend conservative treatment methods.  Patient does wish to proceed forward with an elective circumcision in the OR.  Discussed the risk and benefits of this procedure in detail.  Discussed the nature of this procedure as well.  He verbalized understanding and wished to proceed forward.  I will get him scheduled appropriately with Dr. Mak on August 2, 2024.  Advised patient to clean the area with warm soap and water and dry appropriately twice daily.  Advised him to use Lotrisone cream to the affected area twice daily as needed.  Advised not to use more than 2 consecutive weeks for concerns of skin breakdown from steroid atrophy.  Patient verbalized understanding.    Visit Diagnoses:    ICD-10-CM ICD-9-CM   1. Balanitis  N48.1 607.1       Meds Ordered During  Visit:  New Medications Ordered This Visit   Medications    clotrimazole-betamethasone (LOTRISONE) 1-0.05 % cream     Sig: Apply 1 Application topically to the appropriate area as directed 2 (Two) Times a Day.     Dispense:  45 g     Refill:  1       Follow Up Appointment: Circumcision in the OR  No follow-ups on file.      This document has been electronically signed by Vitaliy Major PA-C   July 5, 2024 10:16 EDT    Part of this note may be an electronic transcription/translation of spoken language to printed text using the Dragon Dictation System.

## 2024-08-01 ENCOUNTER — ANESTHESIA EVENT (OUTPATIENT)
Dept: PERIOP | Facility: HOSPITAL | Age: 59
End: 2024-08-01
Payer: COMMERCIAL

## 2024-08-02 ENCOUNTER — HOSPITAL ENCOUNTER (OUTPATIENT)
Facility: HOSPITAL | Age: 59
Setting detail: HOSPITAL OUTPATIENT SURGERY
Discharge: HOME | End: 2024-08-02
Attending: UROLOGY | Admitting: UROLOGY
Payer: COMMERCIAL

## 2024-08-02 ENCOUNTER — ANESTHESIA (OUTPATIENT)
Dept: PERIOP | Facility: HOSPITAL | Age: 59
End: 2024-08-02
Payer: COMMERCIAL

## 2024-08-02 VITALS
HEIGHT: 67 IN | OXYGEN SATURATION: 94 % | DIASTOLIC BLOOD PRESSURE: 82 MMHG | WEIGHT: 216 LBS | BODY MASS INDEX: 33.9 KG/M2 | SYSTOLIC BLOOD PRESSURE: 128 MMHG | TEMPERATURE: 97.9 F | HEART RATE: 64 BPM | RESPIRATION RATE: 16 BRPM

## 2024-08-02 DIAGNOSIS — N48.1 BALANITIS: ICD-10-CM

## 2024-08-02 PROCEDURE — 25010000002 FENTANYL CITRATE (PF) 50 MCG/ML SOLUTION: Performed by: NURSE ANESTHETIST, CERTIFIED REGISTERED

## 2024-08-02 PROCEDURE — 25810000003 LACTATED RINGERS PER 1000 ML: Performed by: NURSE ANESTHETIST, CERTIFIED REGISTERED

## 2024-08-02 PROCEDURE — 25010000002 ONDANSETRON PER 1 MG: Performed by: NURSE ANESTHETIST, CERTIFIED REGISTERED

## 2024-08-02 PROCEDURE — 25010000002 MIDAZOLAM PER 1 MG: Performed by: NURSE ANESTHETIST, CERTIFIED REGISTERED

## 2024-08-02 PROCEDURE — 54161 CIRCUM 28 DAYS OR OLDER: CPT | Performed by: UROLOGY

## 2024-08-02 PROCEDURE — 25010000002 GENTAMICIN PER 80 MG: Performed by: UROLOGY

## 2024-08-02 PROCEDURE — 25810000003 LACTATED RINGERS PER 1000 ML: Performed by: ANESTHESIOLOGY

## 2024-08-02 PROCEDURE — 25010000002 PROPOFOL 200 MG/20ML EMULSION: Performed by: NURSE ANESTHETIST, CERTIFIED REGISTERED

## 2024-08-02 RX ORDER — HYDROCODONE BITARTRATE AND ACETAMINOPHEN 10; 325 MG/1; MG/1
1 TABLET ORAL EVERY 6 HOURS PRN
Qty: 12 TABLET | Refills: 0 | Status: SHIPPED | OUTPATIENT
Start: 2024-08-02

## 2024-08-02 RX ORDER — SODIUM CHLORIDE, SODIUM LACTATE, POTASSIUM CHLORIDE, CALCIUM CHLORIDE 600; 310; 30; 20 MG/100ML; MG/100ML; MG/100ML; MG/100ML
100 INJECTION, SOLUTION INTRAVENOUS ONCE AS NEEDED
Status: DISCONTINUED | OUTPATIENT
Start: 2024-08-02 | End: 2024-08-02 | Stop reason: HOSPADM

## 2024-08-02 RX ORDER — FENTANYL CITRATE 50 UG/ML
INJECTION, SOLUTION INTRAMUSCULAR; INTRAVENOUS AS NEEDED
Status: DISCONTINUED | OUTPATIENT
Start: 2024-08-02 | End: 2024-08-02 | Stop reason: SURG

## 2024-08-02 RX ORDER — GENTAMICIN SULFATE 80 MG/100ML
80 INJECTION, SOLUTION INTRAVENOUS ONCE
Status: COMPLETED | OUTPATIENT
Start: 2024-08-02 | End: 2024-08-02

## 2024-08-02 RX ORDER — PROPOFOL 10 MG/ML
INJECTION, EMULSION INTRAVENOUS AS NEEDED
Status: DISCONTINUED | OUTPATIENT
Start: 2024-08-02 | End: 2024-08-02 | Stop reason: SURG

## 2024-08-02 RX ORDER — MEPERIDINE HYDROCHLORIDE 25 MG/ML
12.5 INJECTION INTRAMUSCULAR; INTRAVENOUS; SUBCUTANEOUS
Status: DISCONTINUED | OUTPATIENT
Start: 2024-08-02 | End: 2024-08-02 | Stop reason: HOSPADM

## 2024-08-02 RX ORDER — LIDOCAINE HYDROCHLORIDE 20 MG/ML
INJECTION, SOLUTION EPIDURAL; INFILTRATION; INTRACAUDAL; PERINEURAL AS NEEDED
Status: DISCONTINUED | OUTPATIENT
Start: 2024-08-02 | End: 2024-08-02 | Stop reason: SURG

## 2024-08-02 RX ORDER — BACITRACIN ZINC 500 [USP'U]/G
OINTMENT TOPICAL AS NEEDED
Status: DISCONTINUED | OUTPATIENT
Start: 2024-08-02 | End: 2024-08-02 | Stop reason: HOSPADM

## 2024-08-02 RX ORDER — ONDANSETRON 2 MG/ML
4 INJECTION INTRAMUSCULAR; INTRAVENOUS AS NEEDED
Status: DISCONTINUED | OUTPATIENT
Start: 2024-08-02 | End: 2024-08-02 | Stop reason: HOSPADM

## 2024-08-02 RX ORDER — FAMOTIDINE 10 MG/ML
INJECTION, SOLUTION INTRAVENOUS AS NEEDED
Status: DISCONTINUED | OUTPATIENT
Start: 2024-08-02 | End: 2024-08-02 | Stop reason: SURG

## 2024-08-02 RX ORDER — IPRATROPIUM BROMIDE AND ALBUTEROL SULFATE 2.5; .5 MG/3ML; MG/3ML
3 SOLUTION RESPIRATORY (INHALATION) ONCE AS NEEDED
Status: DISCONTINUED | OUTPATIENT
Start: 2024-08-02 | End: 2024-08-02 | Stop reason: HOSPADM

## 2024-08-02 RX ORDER — SODIUM CHLORIDE, SODIUM LACTATE, POTASSIUM CHLORIDE, CALCIUM CHLORIDE 600; 310; 30; 20 MG/100ML; MG/100ML; MG/100ML; MG/100ML
INJECTION, SOLUTION INTRAVENOUS CONTINUOUS PRN
Status: DISCONTINUED | OUTPATIENT
Start: 2024-08-02 | End: 2024-08-02 | Stop reason: SURG

## 2024-08-02 RX ORDER — ONDANSETRON 2 MG/ML
INJECTION INTRAMUSCULAR; INTRAVENOUS AS NEEDED
Status: DISCONTINUED | OUTPATIENT
Start: 2024-08-02 | End: 2024-08-02 | Stop reason: SURG

## 2024-08-02 RX ORDER — FENTANYL CITRATE 50 UG/ML
50 INJECTION, SOLUTION INTRAMUSCULAR; INTRAVENOUS
Status: DISCONTINUED | OUTPATIENT
Start: 2024-08-02 | End: 2024-08-02 | Stop reason: HOSPADM

## 2024-08-02 RX ORDER — SODIUM CHLORIDE 0.9 % (FLUSH) 0.9 %
10 SYRINGE (ML) INJECTION AS NEEDED
Status: DISCONTINUED | OUTPATIENT
Start: 2024-08-02 | End: 2024-08-02 | Stop reason: HOSPADM

## 2024-08-02 RX ORDER — KETOROLAC TROMETHAMINE 30 MG/ML
30 INJECTION, SOLUTION INTRAMUSCULAR; INTRAVENOUS EVERY 6 HOURS PRN
Status: DISCONTINUED | OUTPATIENT
Start: 2024-08-02 | End: 2024-08-02 | Stop reason: HOSPADM

## 2024-08-02 RX ORDER — SODIUM CHLORIDE 0.9 % (FLUSH) 0.9 %
10 SYRINGE (ML) INJECTION EVERY 12 HOURS SCHEDULED
Status: DISCONTINUED | OUTPATIENT
Start: 2024-08-02 | End: 2024-08-02 | Stop reason: HOSPADM

## 2024-08-02 RX ORDER — LIDOCAINE HYDROCHLORIDE AND EPINEPHRINE BITARTRATE 20; .01 MG/ML; MG/ML
INJECTION, SOLUTION SUBCUTANEOUS AS NEEDED
Status: DISCONTINUED | OUTPATIENT
Start: 2024-08-02 | End: 2024-08-02 | Stop reason: HOSPADM

## 2024-08-02 RX ORDER — SODIUM CHLORIDE, SODIUM LACTATE, POTASSIUM CHLORIDE, CALCIUM CHLORIDE 600; 310; 30; 20 MG/100ML; MG/100ML; MG/100ML; MG/100ML
125 INJECTION, SOLUTION INTRAVENOUS ONCE
Status: COMPLETED | OUTPATIENT
Start: 2024-08-02 | End: 2024-08-02

## 2024-08-02 RX ORDER — MIDAZOLAM HYDROCHLORIDE 1 MG/ML
INJECTION INTRAMUSCULAR; INTRAVENOUS AS NEEDED
Status: DISCONTINUED | OUTPATIENT
Start: 2024-08-02 | End: 2024-08-02 | Stop reason: SURG

## 2024-08-02 RX ORDER — SODIUM CHLORIDE 9 MG/ML
40 INJECTION, SOLUTION INTRAVENOUS AS NEEDED
Status: DISCONTINUED | OUTPATIENT
Start: 2024-08-02 | End: 2024-08-02 | Stop reason: HOSPADM

## 2024-08-02 RX ORDER — OXYCODONE HYDROCHLORIDE AND ACETAMINOPHEN 5; 325 MG/1; MG/1
1 TABLET ORAL ONCE AS NEEDED
Status: DISCONTINUED | OUTPATIENT
Start: 2024-08-02 | End: 2024-08-02 | Stop reason: HOSPADM

## 2024-08-02 RX ORDER — MIDAZOLAM HYDROCHLORIDE 1 MG/ML
1 INJECTION INTRAMUSCULAR; INTRAVENOUS
Status: DISCONTINUED | OUTPATIENT
Start: 2024-08-02 | End: 2024-08-02 | Stop reason: HOSPADM

## 2024-08-02 RX ADMIN — LIDOCAINE HYDROCHLORIDE 60 MG: 20 INJECTION, SOLUTION EPIDURAL; INFILTRATION; INTRACAUDAL; PERINEURAL at 08:28

## 2024-08-02 RX ADMIN — SODIUM CHLORIDE, POTASSIUM CHLORIDE, SODIUM LACTATE AND CALCIUM CHLORIDE 125 ML/HR: 600; 310; 30; 20 INJECTION, SOLUTION INTRAVENOUS at 07:33

## 2024-08-02 RX ADMIN — MIDAZOLAM HYDROCHLORIDE 2 MG: 1 INJECTION, SOLUTION INTRAMUSCULAR; INTRAVENOUS at 08:24

## 2024-08-02 RX ADMIN — GENTAMICIN SULFATE 80 MG: 80 INJECTION, SOLUTION INTRAVENOUS at 08:24

## 2024-08-02 RX ADMIN — PROPOFOL 100 MG: 10 INJECTION, EMULSION INTRAVENOUS at 08:28

## 2024-08-02 RX ADMIN — ONDANSETRON 4 MG: 2 INJECTION INTRAMUSCULAR; INTRAVENOUS at 08:40

## 2024-08-02 RX ADMIN — SODIUM CHLORIDE, POTASSIUM CHLORIDE, SODIUM LACTATE AND CALCIUM CHLORIDE: 600; 310; 30; 20 INJECTION, SOLUTION INTRAVENOUS at 08:24

## 2024-08-02 RX ADMIN — FENTANYL CITRATE 100 MCG: 50 INJECTION INTRAMUSCULAR; INTRAVENOUS at 08:24

## 2024-08-02 RX ADMIN — FAMOTIDINE 20 MG: 10 INJECTION, SOLUTION INTRAVENOUS at 08:24

## 2024-08-02 NOTE — ANESTHESIA PREPROCEDURE EVALUATION
Anesthesia Evaluation     no history of anesthetic complications:   NPO Solid Status: > 8 hours  NPO Liquid Status: > 8 hours           Airway   Mallampati: II  TM distance: >3 FB  Neck ROM: full  No difficulty expected  Dental - normal exam     Pulmonary - normal exam   (+) a smoker Former,  Cardiovascular - normal exam        Neuro/Psych  (+) syncope  GI/Hepatic/Renal/Endo      Musculoskeletal     Abdominal  - normal exam    Bowel sounds: normal.   Substance History      OB/GYN          Other   arthritis,                 Anesthesia Plan    ASA 2     general     intravenous induction     Anesthetic plan, risks, benefits, and alternatives have been provided, discussed and informed consent has been obtained with: patient.    CODE STATUS:

## 2024-08-02 NOTE — OP NOTE
CIRCUMCISION  Procedure Note    Regis Carlson  8/2/2024    Pre-op Diagnosis:   Balanitis [N48.1]    Post-op Diagnosis:     Post-Op Diagnosis Codes:     * Balanitis [N48.1]    Procedure/CPT® Codes:  58-year-old white male for elective circumcision.  Elective circumcision--after a significant and appropriate review of the risks and benefits associated with the circumcision including the risk of anesthesia, bleeding, infection, cellulitis of the penis, foreshortening or tenting of the penis with an erection, the rare complication of development of Peyronie's Disease as well as dysesthesias or hyperesthesias of the penis.  He was brought to the operative suite after an appropriate anesthetic.  The penis was prepped and draped in a sterile fashion. I used 1% Xylocaine without epinephrine to do a block circumferentially 8 mm behind the coronal rim and on the outer aspect. I then excised the skin circumferentially with care taken to avoid damage to the underlying Dalton's fascia.  The skin was excised using Bovie electrocautery.  Hemostasis is excellent.  The frenular area was reconstructed with interrupted 4-0 chromic sutures.  Hemostasis was again excellent and was circumferentially we anastomosed with 4-0 chromic sutures.  The meatus was normal.  No damage. The urethra was identified.  The remainder of the procedure went completely unremarkable.  Bacitracin ointment was placed followed by a circumferential dressing.  Sponge and needle counts were correct.  He was returned to the recovery room in excellent condition.    Procedure(s):  CIRCUMCISION    Surgeon(s):  Lai Mak MD    Anesthesia: see anesthesia record    Staff:   Circulator: Melba Padilla RN  Scrub Person: Marguerite Hannah  Other: Soco Mclean    Estimated Blood Loss: none  Urine Voided: * No values recorded between 8/2/2024  8:23 AM and 8/2/2024  8:59 AM *    Specimens:                ID Type Source Tests Collected by Time   A :  Tissue  Foreskin TISSUE EXAM, [facility] ([facility]) Lai Mak MD 8/2/2024 0837         Drains: None    Findings: Phimosis    Blood: N/A    Complications: None    Grafts and Implants: None    Lai Mak MD     Date: 8/2/2024  Time: 09:11 EDT

## 2024-08-02 NOTE — ANESTHESIA POSTPROCEDURE EVALUATION
Patient: Regis Carlson    Procedure Summary       Date: 08/02/24 Room / Location: [facility] OR  /  [facility]    Anesthesia Start: 0824 Anesthesia Stop: 0859    Procedure: CIRCUMCISION (Penis) Diagnosis:       Balanitis      (Balanitis [N48.1])    Surgeons: Lai Mak MD Provider: Kevin French MD    Anesthesia Type: general ASA Status: 2            Anesthesia Type: general    Vitals  Vitals Value Taken Time   /85 08/02/24 0930   Temp 97.3 °F (36.3 °C) 08/02/24 0900   Pulse 61 08/02/24 0930   Resp 20 08/02/24 0930   SpO2 94 % 08/02/24 0930           Post Anesthesia Care and Evaluation    Patient location during evaluation: bedside  Patient participation: complete - patient participated  Level of consciousness: awake and alert  Pain score: 1  Pain management: adequate    Airway patency: patent  Anesthetic complications: No anesthetic complications  PONV Status: none  Cardiovascular status: acceptable  Respiratory status: acceptable  Hydration status: acceptable

## 2024-08-02 NOTE — ANESTHESIA PROCEDURE NOTES
Airway  Urgency: elective    Date/Time: 8/2/2024 8:28 AM  Airway not difficult    General Information and Staff    Patient location during procedure: OR  Anesthesiologist: Kevin French MD  CRNA/CAA: Nicole Chicas CRNA    Indications and Patient Condition  Indications for airway management: airway protection    Preoxygenated: yes  Mask difficulty assessment: 0 - not attempted    Final Airway Details  Final airway type: supraglottic airway      Successful airway: classic  Size 4     Number of attempts at approach: 1  Assessment: lips, teeth, and gum same as pre-op    Additional Comments  LMA placed with no trauma noted. Patient tolerated well. Good seal. Secured.

## 2024-08-06 LAB — REF LAB TEST METHOD: NORMAL

## 2024-08-15 ENCOUNTER — OFFICE VISIT (OUTPATIENT)
Dept: UROLOGY | Facility: CLINIC | Age: 59
End: 2024-08-15
Payer: COMMERCIAL

## 2024-08-15 VITALS
WEIGHT: 221.6 LBS | SYSTOLIC BLOOD PRESSURE: 144 MMHG | HEART RATE: 49 BPM | BODY MASS INDEX: 34.78 KG/M2 | HEIGHT: 67 IN | DIASTOLIC BLOOD PRESSURE: 84 MMHG

## 2024-08-15 DIAGNOSIS — N48.1 BALANITIS: Primary | ICD-10-CM

## 2024-08-15 PROCEDURE — 99213 OFFICE O/P EST LOW 20 MIN: CPT | Performed by: UROLOGY

## 2024-08-15 NOTE — PROGRESS NOTES
Chief Complaint:      Chief Complaint   Patient presents with    Surgery follow up       HPI:   58 y.o. male status post successful circumcision secondary to phimosis no complications Path report is benign postop result looks excellent I will see her back 1 additional time in 6 weeks.  Past Medical History:     Past Medical History:   Diagnosis Date    Arthritis     Bulging lumbar disc     Chronic back pain     Hypokalemia        Current Meds:     Current Outpatient Medications   Medication Sig Dispense Refill    Apple Cider Vinegar 500 MG tablet Take  by mouth.      Cholecalciferol 125 MCG (5000 UT) tablet Take 1 tablet by mouth Daily.      clotrimazole-betamethasone (LOTRISONE) 1-0.05 % cream Apply 1 Application topically to the appropriate area as directed 2 (Two) Times a Day. 45 g 1    Flaxseed, Linseed, (FLAX SEED OIL PO) Take  by mouth.      HYDROcodone-acetaminophen (NORCO)  MG per tablet Take 1 tablet by mouth Every 6 (Six) Hours As Needed for Moderate Pain. 12 tablet 0    multivitamin (MULTI VITAMIN MENS PO) Take  by mouth Daily.       No current facility-administered medications for this visit.        Allergies:      No Known Allergies     Past Surgical History:     Past Surgical History:   Procedure Laterality Date    CIRCUMCISION N/A 2024    Procedure: CIRCUMCISION;  Surgeon: Lai Mak MD;  Location: [facility];  Service: Urology;  Laterality: N/A;    COLONOSCOPY      ENDOSCOPY         Social History:     Social History     Socioeconomic History    Marital status:     Number of children: 1   Tobacco Use    Smoking status: Former     Current packs/day: 0.00     Average packs/day: 1 pack/day for 5.0 years (5.0 ttl pk-yrs)     Types: Cigarettes     Start date:      Quit date:      Years since quittin.6   Vaping Use    Vaping status: Never Used   Substance and Sexual Activity    Alcohol use: Yes     Comment: Occasional social drinker    Drug use: No    Sexual  activity: Defer       Family History:     Family History   Problem Relation Age of Onset    Hypokalemia Mother     No Known Problems Father        Review of Systems:     Review of Systems   Constitutional: Negative.    HENT: Negative.     Eyes: Negative.    Respiratory: Negative.     Cardiovascular: Negative.    Gastrointestinal: Negative.    Endocrine: Negative.    Musculoskeletal: Negative.    Allergic/Immunologic: Negative.    Neurological: Negative.    Hematological: Negative.    Psychiatric/Behavioral: Negative.         Physical Exam:     Physical Exam  Vitals and nursing note reviewed.   Constitutional:       Appearance: He is well-developed.   HENT:      Head: Normocephalic and atraumatic.   Eyes:      Conjunctiva/sclera: Conjunctivae normal.      Pupils: Pupils are equal, round, and reactive to light.   Cardiovascular:      Rate and Rhythm: Normal rate and regular rhythm.      Heart sounds: Normal heart sounds.   Pulmonary:      Effort: Pulmonary effort is normal.      Breath sounds: Normal breath sounds.   Abdominal:      General: Bowel sounds are normal.      Palpations: Abdomen is soft.   Genitourinary:     Comments: Healed circumcised normal phallus.  Musculoskeletal:         General: Normal range of motion.      Cervical back: Normal range of motion.   Skin:     General: Skin is warm and dry.   Neurological:      Mental Status: He is alert and oriented to person, place, and time.      Deep Tendon Reflexes: Reflexes are normal and symmetric.   Psychiatric:         Behavior: Behavior normal.         Thought Content: Thought content normal.         Judgment: Judgment normal.         I have reviewed the following portions of the patient's history: Allergies, current medications, past family history, past medical history, past social history, past surgical history, problem list, and ROS and confirm it is accurate.    Recent Image (CT and/or KUB):      CT Abdomen and Pelvis: No results found for this or any  "previous visit.       CT Stone Protocol: No results found for this or any previous visit.       KUB: No results found for this or any previous visit.       Labs (past 3 months):      Admission on 2024, Discharged on 2024   Component Date Value Ref Range Status    Reference Lab Report 2024    Final                    Value:[facility]  [address] [place] [address]  Phone: [phone] or [phone]  Fax: [phone]  Mariano Bautista M.D., Medical Director    PATIENT NAME                           LABORATORY NO.  [id number]  MERY RUGGIERO                       [id number]  [id number]                         AGE              SEX  SSN           CLIENT REF #  [facility] JENNY              58      1965      [id number]   [id number]    [address]                     REQUESTING M.D.     ATTENDING 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 See diagnosis for details.    Professional interpretation rendered by Eva Chavez M.D. at [facility],  [place], [address] [place] [address].    GROSS DESCRIPTION:  Received in formalin labeled \"foreskin\" is a 6 x 4 x 1.5 cm gray-tan, wrinkled  portion of skin with underlying edematous, tan-pink soft tissue.  No pigmented,  ulcerated or exophytic skin lesions are identified.  Representative sections are  submitted in block A1.  HDM    REVIEWED, DIAGNOSED AND ELECTRONICALLY  SIGNED BY:    Eva Chavez M.D.  CPT CODES:  39390     Lab on " 07/05/2024   Component Date Value Ref Range Status    Hemoglobin A1C 07/05/2024 5.20  4.80 - 5.60 % Final    Total Cholesterol 07/05/2024 187  0 - 200 mg/dL Final    Triglycerides 07/05/2024 292 (H)  0 - 150 mg/dL Final    HDL Cholesterol 07/05/2024 43  40 - 60 mg/dL Final    LDL Cholesterol  07/05/2024 95  0 - 100 mg/dL Final    VLDL Cholesterol 07/05/2024 49 (H)  5 - 40 mg/dL Final    LDL/HDL Ratio 07/05/2024 1.99   Final    25 Hydroxy, Vitamin D 07/05/2024 42.4  30.0 - 100.0 ng/ml Final    Glucose 07/05/2024 89  65 - 99 mg/dL Final    BUN 07/05/2024 15  8 - 23 mg/dL Final    Creatinine 07/05/2024 1.15  0.76 - 1.27 mg/dL Final    Sodium 07/05/2024 138  136 - 145 mmol/L Final    Potassium 07/05/2024 4.3  3.5 - 5.2 mmol/L Final    Chloride 07/05/2024 103  98 - 107 mmol/L Final    CO2 07/05/2024 24.0  22.0 - 29.0 mmol/L Final    Calcium 07/05/2024 9.3  8.6 - 10.5 mg/dL Final    Total Protein 07/05/2024 6.7  6.0 - 8.5 g/dL Final    Albumin 07/05/2024 4.3  3.5 - 5.2 g/dL Final    ALT (SGPT) 07/05/2024 23  1 - 41 U/L Final    AST (SGOT) 07/05/2024 27  1 - 40 U/L Final    Alkaline Phosphatase 07/05/2024 82  39 - 117 U/L Final    Total Bilirubin 07/05/2024 0.5  0.0 - 1.2 mg/dL Final    Globulin 07/05/2024 2.4  gm/dL Final    A/G Ratio 07/05/2024 1.8  g/dL Final    BUN/Creatinine Ratio 07/05/2024 13.0  7.0 - 25.0 Final    Anion Gap 07/05/2024 11.0  5.0 - 15.0 mmol/L Final    eGFR 07/05/2024 72.9  >60.0 mL/min/1.73 Final    Color, UA 07/05/2024 Yellow  Yellow, Straw Final    Appearance, UA 07/05/2024 Clear  Clear Final    pH, UA 07/05/2024 7.5  5.0 - 8.0 Final    Specific Gravity, UA 07/05/2024 1.014  1.005 - 1.030 Final    Glucose, UA 07/05/2024 Negative  Negative Final    Ketones, UA 07/05/2024 Negative  Negative Final    Bilirubin, UA 07/05/2024 Negative  Negative Final    Blood, UA 07/05/2024 Negative  Negative Final    Protein, UA 07/05/2024 Negative  Negative Final    Leuk Esterase, UA 07/05/2024 Negative  Negative  Final    Nitrite, UA 07/05/2024 Negative  Negative Final    Urobilinogen, UA 07/05/2024 0.2 E.U./dL  0.2 - 1.0 E.U./dL Final    RBC, UA 07/05/2024 0-2  None Seen, 0-2 /HPF Final    WBC, UA 07/05/2024 None Seen  None Seen, 0-2 /HPF Final    Bacteria, UA 07/05/2024 None Seen  None Seen /HPF Final    Squamous Epithelial Cells, UA 07/05/2024 None Seen  None Seen, 0-2 /HPF Final    Hyaline Casts, UA 07/05/2024 None Seen  None Seen /LPF Final    Methodology 07/05/2024 Automated Microscopy   Final    WBC 07/05/2024 6.52  3.40 - 10.80 10*3/mm3 Final    RBC 07/05/2024 4.59  4.14 - 5.80 10*6/mm3 Final    Hemoglobin 07/05/2024 14.5  13.0 - 17.7 g/dL Final    Hematocrit 07/05/2024 43.7  37.5 - 51.0 % Final    MCV 07/05/2024 95.2  79.0 - 97.0 fL Final    MCH 07/05/2024 31.6  26.6 - 33.0 pg Final    MCHC 07/05/2024 33.2  31.5 - 35.7 g/dL Final    RDW 07/05/2024 14.6  12.3 - 15.4 % Final    RDW-SD 07/05/2024 51.1  37.0 - 54.0 fl Final    MPV 07/05/2024 9.3  6.0 - 12.0 fL Final    Platelets 07/05/2024 177  140 - 450 10*3/mm3 Final    Neutrophil % 07/05/2024 55.3  42.7 - 76.0 % Final    Lymphocyte % 07/05/2024 31.7  19.6 - 45.3 % Final    Monocyte % 07/05/2024 10.1  5.0 - 12.0 % Final    Eosinophil % 07/05/2024 1.8  0.3 - 6.2 % Final    Basophil % 07/05/2024 0.8  0.0 - 1.5 % Final    Immature Grans % 07/05/2024 0.3  0.0 - 0.5 % Final    Neutrophils, Absolute 07/05/2024 3.60  1.70 - 7.00 10*3/mm3 Final    Lymphocytes, Absolute 07/05/2024 2.07  0.70 - 3.10 10*3/mm3 Final    Monocytes, Absolute 07/05/2024 0.66  0.10 - 0.90 10*3/mm3 Final    Eosinophils, Absolute 07/05/2024 0.12  0.00 - 0.40 10*3/mm3 Final    Basophils, Absolute 07/05/2024 0.05  0.00 - 0.20 10*3/mm3 Final    Immature Grans, Absolute 07/05/2024 0.02  0.00 - 0.05 10*3/mm3 Final    nRBC 07/05/2024 0.0  0.0 - 0.2 /100 WBC Final        Procedure:       Assessment/Plan:   Phimosis-status post successful circumcision.            This document has been electronically signed by  AMINA ABREU MD August 15, 2024 09:22 EDT    Dictated Utilizing Dragon Dictation: Part of this note may be an electronic transcription/translation of spoken language to printed text using the Dragon Dictation System.  Answers submitted by the patient for this visit:  Primary Reason for Visit (Submitted on 8/8/2024)  What is the primary reason for your visit?: Other  Other (Submitted on 8/8/2024)  Please describe your symptoms.: Don't have symptoms this is follow up appointment after surgery  Have you had these symptoms before?: No  How long have you been having these symptoms?: 1-4 days  Please list any medications you are currently taking for this condition.: None  Please describe any probable cause for these symptoms. : No symptoms

## 2024-09-09 ENCOUNTER — LAB (OUTPATIENT)
Dept: LAB | Facility: HOSPITAL | Age: 59
End: 2024-09-09
Payer: COMMERCIAL

## 2024-09-09 ENCOUNTER — TRANSCRIBE ORDERS (OUTPATIENT)
Dept: ADMINISTRATIVE | Facility: HOSPITAL | Age: 59
End: 2024-09-09
Payer: COMMERCIAL

## 2024-09-09 DIAGNOSIS — J31.0 CHRONIC RHINITIS: Primary | ICD-10-CM

## 2024-09-09 DIAGNOSIS — J31.0 CHRONIC RHINITIS: ICD-10-CM

## 2024-09-09 PROCEDURE — 86003 ALLG SPEC IGE CRUDE XTRC EA: CPT

## 2024-09-09 PROCEDURE — 82785 ASSAY OF IGE: CPT

## 2024-09-09 PROCEDURE — 36415 COLL VENOUS BLD VENIPUNCTURE: CPT

## 2024-09-11 LAB — IGE SERPL-ACNC: 4 IU/ML (ref 6–495)

## 2024-09-12 LAB
A ALTERNATA IGE QN: <0.1 KU/L
A FUMIGATUS IGE QN: <0.1 KU/L
BERMUDA GRASS IGE QN: <0.1 KU/L
BOXELDER IGE QN: <0.1 KU/L
C HERBARUM IGE QN: <0.1 KU/L
CALIF WALNUT POLN IGE QN: <0.1 KU/L
CAT DANDER IGE QN: <0.1 KU/L
CMN PIGWEED IGE QN: <0.1 KU/L
COMMON RAGWEED IGE QN: <0.1 KU/L
CONV CLASS DESCRIPTION: ABNORMAL
COTTONWOOD IGE QN: <0.1 KU/L
D FARINAE IGE QN: <0.1 KU/L
D PTERONYSS IGE QN: <0.1 KU/L
DOG DANDER IGE QN: <0.1 KU/L
IGE SERPL-ACNC: 5 IU/ML (ref 6–495)
LONDON PLANE IGE QN: <0.1 KU/L
MOUSE URINE PROT IGE QN: <0.1 KU/L
MT JUNIPER IGE QN: <0.1 KU/L
P NOTATUM IGE QN: <0.1 KU/L
PECAN/HICK TREE IGE QN: <0.1 KU/L
ROACH IGE QN: <0.1 KU/L
SALTWORT IGE QN: <0.1 KU/L
SHEEP SORREL IGE QN: <0.1 KU/L
SILVER BIRCH IGE QN: <0.1 KU/L
TIMOTHY IGE QN: <0.1 KU/L
WHITE ASH IGE QN: <0.1 KU/L
WHITE ELM IGE QN: <0.1 KU/L
WHITE MULBERRY IGE QN: <0.1 KU/L
WHITE OAK IGE QN: <0.1 KU/L

## 2024-10-03 ENCOUNTER — OFFICE VISIT (OUTPATIENT)
Dept: UROLOGY | Facility: CLINIC | Age: 59
End: 2024-10-03
Payer: COMMERCIAL

## 2024-10-03 VITALS
WEIGHT: 221 LBS | SYSTOLIC BLOOD PRESSURE: 139 MMHG | HEART RATE: 60 BPM | HEIGHT: 67 IN | BODY MASS INDEX: 34.69 KG/M2 | DIASTOLIC BLOOD PRESSURE: 81 MMHG

## 2024-10-03 DIAGNOSIS — N48.1 BALANITIS: Primary | ICD-10-CM

## 2024-10-03 PROCEDURE — 99213 OFFICE O/P EST LOW 20 MIN: CPT | Performed by: UROLOGY

## 2024-10-03 RX ORDER — TADALAFIL 5 MG/1
5 TABLET ORAL DAILY PRN
Qty: 30 TABLET | Refills: 6 | Status: SHIPPED | OUTPATIENT
Start: 2024-10-03

## 2024-10-03 NOTE — PROGRESS NOTES
Chief Complaint:      Chief Complaint   Patient presents with    6 Week Follow Circumcision        HPI:   59 y.o. male.  That is post an elective circumcision with great results he has a excellent cosmetic result I will see him back 1 more time in 6 months to make sure he is healed completely    Past Medical History:     Past Medical History:   Diagnosis Date    Arthritis     Bulging lumbar disc     Chronic back pain     Hypokalemia        Current Meds:     Current Outpatient Medications   Medication Sig Dispense Refill    Apple Cider Vinegar 500 MG tablet Take  by mouth.      Cholecalciferol 125 MCG (5000 UT) tablet Take 1 tablet by mouth Daily.      clotrimazole-betamethasone (LOTRISONE) 1-0.05 % cream Apply 1 Application topically to the appropriate area as directed 2 (Two) Times a Day. 45 g 1    Flaxseed, Linseed, (FLAX SEED OIL PO) Take  by mouth.      HYDROcodone-acetaminophen (NORCO)  MG per tablet Take 1 tablet by mouth Every 6 (Six) Hours As Needed for Moderate Pain. 12 tablet 0    multivitamin (MULTI VITAMIN MENS PO) Take  by mouth Daily.       No current facility-administered medications for this visit.        Allergies:      No Known Allergies     Past Surgical History:     Past Surgical History:   Procedure Laterality Date    CIRCUMCISION N/A 2024    Procedure: CIRCUMCISION;  Surgeon: Lai Mak MD;  Location: [facility];  Service: Urology;  Laterality: N/A;    COLONOSCOPY      ENDOSCOPY         Social History:     Social History     Socioeconomic History    Marital status:     Number of children: 1   Tobacco Use    Smoking status: Former     Current packs/day: 0.00     Average packs/day: 1 pack/day for 5.0 years (5.0 ttl pk-yrs)     Types: Cigarettes     Start date:      Quit date:      Years since quittin.7   Vaping Use    Vaping status: Never Used   Substance and Sexual Activity    Alcohol use: Yes     Comment: Occasional social drinker    Drug use: No     Sexual activity: Defer       Family History:     Family History   Problem Relation Age of Onset    Hypokalemia Mother     No Known Problems Father        Review of Systems:     Review of Systems   Constitutional: Negative.    HENT: Negative.     Eyes: Negative.    Respiratory: Negative.     Cardiovascular: Negative.    Gastrointestinal: Negative.    Endocrine: Negative.    Musculoskeletal: Negative.    Allergic/Immunologic: Negative.    Neurological: Negative.    Hematological: Negative.    Psychiatric/Behavioral: Negative.         Physical Exam:     Physical Exam  Vitals and nursing note reviewed.   Constitutional:       Appearance: He is well-developed.   HENT:      Head: Normocephalic and atraumatic.   Eyes:      Conjunctiva/sclera: Conjunctivae normal.      Pupils: Pupils are equal, round, and reactive to light.   Cardiovascular:      Rate and Rhythm: Normal rate and regular rhythm.      Heart sounds: Normal heart sounds.   Pulmonary:      Effort: Pulmonary effort is normal.      Breath sounds: Normal breath sounds.   Abdominal:      General: Bowel sounds are normal.      Palpations: Abdomen is soft.   Genitourinary:     Comments: Healed penis without erythema induration or tenderness  Musculoskeletal:         General: Normal range of motion.      Cervical back: Normal range of motion.   Skin:     General: Skin is warm and dry.   Neurological:      Mental Status: He is alert and oriented to person, place, and time.      Deep Tendon Reflexes: Reflexes are normal and symmetric.   Psychiatric:         Behavior: Behavior normal.         Thought Content: Thought content normal.         Judgment: Judgment normal.         I have reviewed the following portions of the patient's history: Allergies, current medications, past family history, past medical history, past social history, past surgical history, problem list, and ROS and confirm it is accurate.    Recent Image (CT and/or KUB):      CT Abdomen and Pelvis: No  results found for this or any previous visit.       CT Stone Protocol: No results found for this or any previous visit.       KUB: No results found for this or any previous visit.       Labs (past 3 months):      Lab on 09/09/2024   Component Date Value Ref Range Status    Class Description 09/09/2024 Comment   Final        Levels of Specific IgE       Class  Description of Class      ---------------------------  -----  --------------------                     < 0.10         0         Negative             0.10 -    0.31         0/I       Equivocal/Low             0.32 -    0.55         I         Low             0.56 -    1.40         II        Moderate             1.41 -    3.90         III       High             3.91 -   19.00         IV        Very High            19.01 -  100.00         V         Very High                    >100.00         VI        Very High    IgE 09/09/2024 5 (L)  6 - 495 IU/mL Final    D. pteronyssinus (dust mite) 09/09/2024 <0.10  Class 0 kU/L Final    D. farinae (dust mite) 09/09/2024 <0.10  Class 0 kU/L Final    Cat Dander 09/09/2024 <0.10  Class 0 kU/L Final    Dog Dander, IgE 09/09/2024 <0.10  Class 0 kU/L Final    [place] Grass 09/09/2024 <0.10  Class 0 kU/L Final    Wolfgang Grass 09/09/2024 <0.10  Class 0 kU/L Final    Cockroach,Kyrgyz 09/09/2024 <0.10  Class 0 kU/L Final    Penicillium chrysogen 09/09/2024 <0.10  Class 0 kU/L Final    Cladosporium herbarum 09/09/2024 <0.10  Class 0 kU/L Final    Aspergillus fumigatus 09/09/2024 <0.10  Class 0 kU/L Final    Alternaria alternata 09/09/2024 <0.10  Class 0 kU/L Final    Maple/Hardy 09/09/2024 <0.10  Class 0 kU/L Final    Birch, Silver 09/09/2024 <0.10  Class 0 kU/L Final    [place], Mountain 09/09/2024 <0.10  Class 0 kU/L Final    [place], White 09/09/2024 <0.10  Class 0 kU/L Final    Elm, American 09/09/2024 <0.10  Class 0 kU/L Final    [place], Pollen 09/09/2024 <0.10  Class 0 kU/L Final    Maple Horseheads [place] 09/09/2024 <0.10  Class 0  kU/L Final    B[place] Tree 2024 <0.10  Class 0 kU/L Final    Jim, White 2024 <0.10  Class 0 kU/L Final    Pecan/[place] Tree 2024 <0.10  Class 0 kU/L Final    White [place] 2024 <0.10  Class 0 kU/L Final    Ragweed, Common/Short 2024 <0.10  Class 0 kU/L Final    Russian Thistle 2024 <0.10  Class 0 kU/L Final    Pigweed, Rough/Common 2024 <0.10  Class 0 kU/L Final    Sheep [place] 2024 <0.10  Class 0 kU/L Final    Mouse Urine 2024 <0.10  Class 0 kU/L Final    IgE 2024 4 (L)  6 - 495 IU/mL Final   Admission on 2024, Discharged on 2024   Component Date Value Ref Range Status    Reference Lab Report 2024    Final                    Value:[facility]  [address] [place] [address]  Phone: [phone] or [phone]  Fax: [phone]  Mariano Bautista M.D., Medical Director    PATIENT NAME                           LABORATORY NO.  786  MERY RUGGIERO                       [id number]  [id number]                         AGE              SEX  SSN           CLIENT REF #  [facility] JENNY              58      1965      [id number]   [id number]    [address]                     REQUESTING M.D.     ATTENDING 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 See diagnosis for details.    Professional interpretation rendered by Eva Chavez M.D. at [facility],  [place], [address] [place] [address].    GROSS  "DESCRIPTION:  Received in formalin labeled \"foreskin\" is a 6 x 4 x 1.5 cm gray-tan, wrinkled  portion of skin with underlying edematous, tan-pink soft tissue.  No pigmented,  ulcerated or exophytic skin lesions are identified.  Representative sections are  submitted in block A1.  HDM    REVIEWED, DIAGNOSED AND ELECTRONICALLY  SIGNED BY:    Eva Chavez M.D.  CPT CODES:  [id number]     Lab on 07/05/2024   Component Date Value Ref Range Status    Hemoglobin A1C 07/05/2024 5.20  4.80 - 5.60 % Final    Total Cholesterol 07/05/2024 187  0 - 200 mg/dL Final    Triglycerides 07/05/2024 292 (H)  0 - 150 mg/dL Final    HDL Cholesterol 07/05/2024 43  40 - 60 mg/dL Final    LDL Cholesterol  07/05/2024 95  0 - 100 mg/dL Final    VLDL Cholesterol 07/05/2024 49 (H)  5 - 40 mg/dL Final    LDL/HDL Ratio 07/05/2024 1.99   Final    25 Hydroxy, Vitamin D 07/05/2024 42.4  30.0 - 100.0 ng/ml Final    Glucose 07/05/2024 89  65 - 99 mg/dL Final    BUN 07/05/2024 15  8 - 23 mg/dL Final    Creatinine 07/05/2024 1.15  0.76 - 1.27 mg/dL Final    Sodium 07/05/2024 138  136 - 145 mmol/L Final    Potassium 07/05/2024 4.3  3.5 - 5.2 mmol/L Final    Chloride 07/05/2024 103  98 - 107 mmol/L Final    CO2 07/05/2024 24.0  22.0 - 29.0 mmol/L Final    Calcium 07/05/2024 9.3  8.6 - 10.5 mg/dL Final    Total Protein 07/05/2024 6.7  6.0 - 8.5 g/dL Final    Albumin 07/05/2024 4.3  3.5 - 5.2 g/dL Final    ALT (SGPT) 07/05/2024 23  1 - 41 U/L Final    AST (SGOT) 07/05/2024 27  1 - 40 U/L Final    Alkaline Phosphatase 07/05/2024 82  39 - 117 U/L Final    Total Bilirubin 07/05/2024 0.5  0.0 - 1.2 mg/dL Final    Globulin 07/05/2024 2.4  gm/dL Final    A/G Ratio 07/05/2024 1.8  g/dL Final    BUN/Creatinine Ratio 07/05/2024 13.0  7.0 - 25.0 Final    Anion Gap 07/05/2024 11.0  5.0 - 15.0 mmol/L Final    eGFR 07/05/2024 72.9  >60.0 mL/min/1.73 Final    Color, UA 07/05/2024 Yellow  Yellow, Straw Final    Appearance, UA 07/05/2024 Clear  Clear Final    pH, UA " 07/05/2024 7.5  5.0 - 8.0 Final    Specific Gravity, UA 07/05/2024 1.014  1.005 - 1.030 Final    Glucose, UA 07/05/2024 Negative  Negative Final    Ketones, UA 07/05/2024 Negative  Negative Final    Bilirubin, UA 07/05/2024 Negative  Negative Final    Blood, UA 07/05/2024 Negative  Negative Final    Protein, UA 07/05/2024 Negative  Negative Final    Leuk Esterase, UA 07/05/2024 Negative  Negative Final    Nitrite, UA 07/05/2024 Negative  Negative Final    Urobilinogen, UA 07/05/2024 0.2 E.U./dL  0.2 - 1.0 E.U./dL Final    RBC, UA 07/05/2024 0-2  None Seen, 0-2 /HPF Final    WBC, UA 07/05/2024 None Seen  None Seen, 0-2 /HPF Final    Bacteria, UA 07/05/2024 None Seen  None Seen /HPF Final    Squamous Epithelial Cells, UA 07/05/2024 None Seen  None Seen, 0-2 /HPF Final    Hyaline Casts, UA 07/05/2024 None Seen  None Seen /LPF Final    Methodology 07/05/2024 Automated Microscopy   Final    WBC 07/05/2024 6.52  3.40 - 10.80 10*3/mm3 Final    RBC 07/05/2024 4.59  4.14 - 5.80 10*6/mm3 Final    Hemoglobin 07/05/2024 14.5  13.0 - 17.7 g/dL Final    Hematocrit 07/05/2024 43.7  37.5 - 51.0 % Final    MCV 07/05/2024 95.2  79.0 - 97.0 fL Final    MCH 07/05/2024 31.6  26.6 - 33.0 pg Final    MCHC 07/05/2024 33.2  31.5 - 35.7 g/dL Final    RDW 07/05/2024 14.6  12.3 - 15.4 % Final    RDW-SD 07/05/2024 51.1  37.0 - 54.0 fl Final    MPV 07/05/2024 9.3  6.0 - 12.0 fL Final    Platelets 07/05/2024 177  140 - 450 10*3/mm3 Final    Neutrophil % 07/05/2024 55.3  42.7 - 76.0 % Final    Lymphocyte % 07/05/2024 31.7  19.6 - 45.3 % Final    Monocyte % 07/05/2024 10.1  5.0 - 12.0 % Final    Eosinophil % 07/05/2024 1.8  0.3 - 6.2 % Final    Basophil % 07/05/2024 0.8  0.0 - 1.5 % Final    Immature Grans % 07/05/2024 0.3  0.0 - 0.5 % Final    Neutrophils, Absolute 07/05/2024 3.60  1.70 - 7.00 10*3/mm3 Final    Lymphocytes, Absolute 07/05/2024 2.07  0.70 - 3.10 10*3/mm3 Final    Monocytes, Absolute 07/05/2024 0.66  0.10 - 0.90 10*3/mm3 Final     Eosinophils, Absolute 07/05/2024 0.12  0.00 - 0.40 10*3/mm3 Final    Basophils, Absolute 07/05/2024 0.05  0.00 - 0.20 10*3/mm3 Final    Immature Grans, Absolute 07/05/2024 0.02  0.00 - 0.05 10*3/mm3 Final    nRBC 07/05/2024 0.0  0.0 - 0.2 /100 WBC Final        Procedure:       Assessment/Plan:   Phimosis-status post circumcision with good results              This document has been electronically signed by AMINA ABREU MD October 3, 2024 07:57 EDT    Dictated Utilizing Dragon Dictation: Part of this note may be an electronic transcription/translation of spoken language to printed text using the Dragon Dictation System.

## 2025-06-13 ENCOUNTER — TRANSCRIBE ORDERS (OUTPATIENT)
Dept: ADMINISTRATIVE | Facility: HOSPITAL | Age: 60
End: 2025-06-13
Payer: COMMERCIAL

## 2025-06-13 ENCOUNTER — LAB (OUTPATIENT)
Dept: LAB | Facility: HOSPITAL | Age: 60
End: 2025-06-13
Payer: COMMERCIAL

## 2025-06-13 DIAGNOSIS — Z12.5 PROSTATE CANCER SCREENING: ICD-10-CM

## 2025-06-13 DIAGNOSIS — R73.09 ELEVATED GLUCOSE: ICD-10-CM

## 2025-06-13 DIAGNOSIS — R53.83 TIREDNESS: ICD-10-CM

## 2025-06-13 DIAGNOSIS — E55.9 VITAMIN D INSUFFICIENCY: ICD-10-CM

## 2025-06-13 DIAGNOSIS — E78.5 HYPERLIPIDEMIA, UNSPECIFIED HYPERLIPIDEMIA TYPE: ICD-10-CM

## 2025-06-13 DIAGNOSIS — D22.9 CHANGE IN MOLE: ICD-10-CM

## 2025-06-13 DIAGNOSIS — D22.9 CHANGE IN MOLE: Primary | ICD-10-CM

## 2025-06-13 DIAGNOSIS — Z13.29 SCREENING FOR THYROID DISORDER: ICD-10-CM

## 2025-06-13 LAB
25(OH)D3 SERPL-MCNC: 44.6 NG/ML (ref 30–100)
ALBUMIN SERPL-MCNC: 4.3 G/DL (ref 3.5–5.2)
ALBUMIN UR-MCNC: <1.2 MG/DL
ALBUMIN/GLOB SERPL: 1.4 G/DL
ALP SERPL-CCNC: 89 U/L (ref 39–117)
ALT SERPL W P-5'-P-CCNC: 32 U/L (ref 1–41)
ANION GAP SERPL CALCULATED.3IONS-SCNC: 10.9 MMOL/L (ref 5–15)
AST SERPL-CCNC: 29 U/L (ref 1–40)
BACTERIA UR QL AUTO: NORMAL /HPF
BASOPHILS # BLD AUTO: 0.05 10*3/MM3 (ref 0–0.2)
BASOPHILS NFR BLD AUTO: 0.8 % (ref 0–1.5)
BILIRUB SERPL-MCNC: 0.7 MG/DL (ref 0–1.2)
BUN SERPL-MCNC: 18 MG/DL (ref 8–23)
BUN/CREAT SERPL: 16.5 (ref 7–25)
CALCIUM SPEC-SCNC: 8.9 MG/DL (ref 8.6–10.5)
CHLORIDE SERPL-SCNC: 106 MMOL/L (ref 98–107)
CHOLEST SERPL-MCNC: 176 MG/DL (ref 0–200)
CO2 SERPL-SCNC: 24.1 MMOL/L (ref 22–29)
CREAT SERPL-MCNC: 1.09 MG/DL (ref 0.76–1.27)
CREAT UR-MCNC: 90.4 MG/DL
DEPRECATED RDW RBC AUTO: 50 FL (ref 37–54)
EGFRCR SERPLBLD CKD-EPI 2021: 77.2 ML/MIN/1.73
EOSINOPHIL # BLD AUTO: 0.2 10*3/MM3 (ref 0–0.4)
EOSINOPHIL NFR BLD AUTO: 3.3 % (ref 0.3–6.2)
ERYTHROCYTE [DISTWIDTH] IN BLOOD BY AUTOMATED COUNT: 13.7 % (ref 12.3–15.4)
GLOBULIN UR ELPH-MCNC: 3.1 GM/DL
GLUCOSE SERPL-MCNC: 92 MG/DL (ref 65–99)
HCT VFR BLD AUTO: 48.2 % (ref 37.5–51)
HDLC SERPL-MCNC: 42 MG/DL (ref 40–60)
HGB BLD-MCNC: 15.3 G/DL (ref 13–17.7)
HYALINE CASTS UR QL AUTO: NORMAL /LPF
IMM GRANULOCYTES # BLD AUTO: 0.01 10*3/MM3 (ref 0–0.05)
IMM GRANULOCYTES NFR BLD AUTO: 0.2 % (ref 0–0.5)
LDLC SERPL CALC-MCNC: 105 MG/DL (ref 0–100)
LDLC/HDLC SERPL: 2.4 {RATIO}
LYMPHOCYTES # BLD AUTO: 2.16 10*3/MM3 (ref 0.7–3.1)
LYMPHOCYTES NFR BLD AUTO: 35.8 % (ref 19.6–45.3)
MCH RBC QN AUTO: 31.5 PG (ref 26.6–33)
MCHC RBC AUTO-ENTMCNC: 31.7 G/DL (ref 31.5–35.7)
MCV RBC AUTO: 99.2 FL (ref 79–97)
MICROALBUMIN/CREAT UR: NORMAL MG/G{CREAT}
MONOCYTES # BLD AUTO: 0.67 10*3/MM3 (ref 0.1–0.9)
MONOCYTES NFR BLD AUTO: 11.1 % (ref 5–12)
NEUTROPHILS NFR BLD AUTO: 2.94 10*3/MM3 (ref 1.7–7)
NEUTROPHILS NFR BLD AUTO: 48.8 % (ref 42.7–76)
NRBC BLD AUTO-RTO: 0 /100 WBC (ref 0–0.2)
PLATELET # BLD AUTO: 195 10*3/MM3 (ref 140–450)
PMV BLD AUTO: 9.5 FL (ref 6–12)
POTASSIUM SERPL-SCNC: 4.3 MMOL/L (ref 3.5–5.2)
PROT SERPL-MCNC: 7.4 G/DL (ref 6–8.5)
PSA SERPL-MCNC: 0.89 NG/ML (ref 0–4)
RBC # BLD AUTO: 4.86 10*6/MM3 (ref 4.14–5.8)
RBC # UR STRIP: NORMAL /HPF
REF LAB TEST METHOD: NORMAL
SODIUM SERPL-SCNC: 141 MMOL/L (ref 136–145)
SQUAMOUS #/AREA URNS HPF: NORMAL /HPF
T4 FREE SERPL-MCNC: 1.01 NG/DL (ref 0.92–1.68)
TRIGL SERPL-MCNC: 167 MG/DL (ref 0–150)
TSH SERPL DL<=0.05 MIU/L-ACNC: 2.75 UIU/ML (ref 0.27–4.2)
VLDLC SERPL-MCNC: 29 MG/DL (ref 5–40)
WBC # UR STRIP: NORMAL /HPF
WBC NRBC COR # BLD AUTO: 6.03 10*3/MM3 (ref 3.4–10.8)

## 2025-06-13 PROCEDURE — 82570 ASSAY OF URINE CREATININE: CPT

## 2025-06-13 PROCEDURE — 36415 COLL VENOUS BLD VENIPUNCTURE: CPT

## 2025-06-13 PROCEDURE — 80050 GENERAL HEALTH PANEL: CPT

## 2025-06-13 PROCEDURE — 80061 LIPID PANEL: CPT

## 2025-06-13 PROCEDURE — 82043 UR ALBUMIN QUANTITATIVE: CPT

## 2025-06-13 PROCEDURE — G0103 PSA SCREENING: HCPCS

## 2025-06-13 PROCEDURE — 84439 ASSAY OF FREE THYROXINE: CPT

## 2025-06-13 PROCEDURE — 82306 VITAMIN D 25 HYDROXY: CPT

## 2025-06-13 PROCEDURE — 81015 MICROSCOPIC EXAM OF URINE: CPT

## 2025-07-18 ENCOUNTER — TRANSCRIBE ORDERS (OUTPATIENT)
Dept: ADMINISTRATIVE | Facility: HOSPITAL | Age: 60
End: 2025-07-18
Payer: COMMERCIAL

## 2025-07-18 ENCOUNTER — HOSPITAL ENCOUNTER (OUTPATIENT)
Dept: GENERAL RADIOLOGY | Facility: HOSPITAL | Age: 60
Discharge: HOME | End: 2025-07-18
Payer: COMMERCIAL

## 2025-07-18 DIAGNOSIS — M25.50 PAIN IN JOINT, MULTIPLE SITES: Primary | ICD-10-CM

## 2025-07-18 DIAGNOSIS — M25.50 PAIN IN JOINT, MULTIPLE SITES: ICD-10-CM

## 2025-07-18 DIAGNOSIS — M25.561 RIGHT KNEE PAIN, UNSPECIFIED CHRONICITY: ICD-10-CM

## 2025-07-18 DIAGNOSIS — M25.562 LEFT KNEE PAIN, UNSPECIFIED CHRONICITY: ICD-10-CM

## 2025-07-18 PROCEDURE — 73560 X-RAY EXAM OF KNEE 1 OR 2: CPT

## (undated) DEVICE — HOLDER: Brand: DEROYAL

## (undated) DEVICE — GLV SURG PREMIERPRO MIC LTX PF SZ8 BRN

## (undated) DEVICE — ELECTRD NDL EZ CLN MOD 2.75IN

## (undated) DEVICE — PREMIUM WET SKIN PREP TRAY: Brand: MEDLINE INDUSTRIES, INC.

## (undated) DEVICE — SUT GUT CHRM 4/0 RB1 27IN U203H

## (undated) DEVICE — BNDG GZ SOF-FORM CONFRM 2X75IN LF STRL

## (undated) DEVICE — PK BASIC 70

## (undated) DEVICE — GOWN,REINF,POLY,ECL,PP SLV,XXL: Brand: MEDLINE

## (undated) DEVICE — PATIENT RETURN ELECTRODE, SINGLE-USE, CONTACT QUALITY MONITORING, ADULT, WITH 9FT CORD, FOR PATIENTS WEIGING OVER 33LBS. (15KG): Brand: MEGADYNE

## (undated) DEVICE — SUT GUT CHRM 3/0 SH 27IN G122H

## (undated) DEVICE — PENCL ES MEGADINE EZ/CLEAN BUTN W/HOLSTR 10FT

## (undated) DEVICE — DRAPE,LAPAROTOMY,PED,STERILE: Brand: MEDLINE